# Patient Record
Sex: MALE | Race: BLACK OR AFRICAN AMERICAN | Employment: UNEMPLOYED | URBAN - METROPOLITAN AREA
[De-identification: names, ages, dates, MRNs, and addresses within clinical notes are randomized per-mention and may not be internally consistent; named-entity substitution may affect disease eponyms.]

---

## 2017-09-26 ENCOUNTER — OFFICE VISIT (OUTPATIENT)
Dept: INTERNAL MEDICINE CLINIC | Age: 42
End: 2017-09-26

## 2017-09-26 VITALS
DIASTOLIC BLOOD PRESSURE: 74 MMHG | SYSTOLIC BLOOD PRESSURE: 122 MMHG | WEIGHT: 299 LBS | HEIGHT: 73 IN | BODY MASS INDEX: 39.63 KG/M2

## 2017-09-26 DIAGNOSIS — G47.33 OBSTRUCTIVE SLEEP APNEA: ICD-10-CM

## 2017-09-26 DIAGNOSIS — L85.3 XEROSIS OF SKIN: ICD-10-CM

## 2017-09-26 DIAGNOSIS — G47.00 INSOMNIA, UNSPECIFIED TYPE: Primary | ICD-10-CM

## 2017-09-26 DIAGNOSIS — L03.90 CELLULITIS, UNSPECIFIED CELLULITIS SITE: ICD-10-CM

## 2017-09-26 DIAGNOSIS — Z99.2 ESRD (END STAGE RENAL DISEASE) ON DIALYSIS (HCC): ICD-10-CM

## 2017-09-26 DIAGNOSIS — N18.6 ESRD (END STAGE RENAL DISEASE) ON DIALYSIS (HCC): ICD-10-CM

## 2017-09-26 PROCEDURE — G8417 CALC BMI ABV UP PARAM F/U: HCPCS | Performed by: INTERNAL MEDICINE

## 2017-09-26 PROCEDURE — G8427 DOCREV CUR MEDS BY ELIG CLIN: HCPCS | Performed by: INTERNAL MEDICINE

## 2017-09-26 PROCEDURE — 1036F TOBACCO NON-USER: CPT | Performed by: INTERNAL MEDICINE

## 2017-09-26 PROCEDURE — 99203 OFFICE O/P NEW LOW 30 MIN: CPT | Performed by: INTERNAL MEDICINE

## 2017-09-26 RX ORDER — ZOLPIDEM TARTRATE 5 MG/1
5 TABLET ORAL NIGHTLY PRN
Qty: 30 TABLET | Refills: 1 | Status: SHIPPED | OUTPATIENT
Start: 2017-09-26 | End: 2018-02-03 | Stop reason: SDUPTHER

## 2017-09-26 RX ORDER — DESONIDE 0.5 MG/G
CREAM TOPICAL
Qty: 15 TUBE | Refills: 3 | Status: SHIPPED | OUTPATIENT
Start: 2017-09-26 | End: 2018-04-23

## 2017-09-26 ASSESSMENT — ENCOUNTER SYMPTOMS
RHINORRHEA: 0
ABDOMINAL PAIN: 0
CONSTIPATION: 0
SINUS PRESSURE: 0
DIARRHEA: 0
EYE REDNESS: 0
BACK PAIN: 0
NAUSEA: 0
WHEEZING: 0
SORE THROAT: 0
SHORTNESS OF BREATH: 0
VOMITING: 0
CHEST TIGHTNESS: 0
COUGH: 0

## 2017-09-28 ENCOUNTER — TELEPHONE (OUTPATIENT)
Dept: INTERNAL MEDICINE CLINIC | Age: 42
End: 2017-09-28

## 2017-10-24 ENCOUNTER — OFFICE VISIT (OUTPATIENT)
Dept: INTERNAL MEDICINE CLINIC | Age: 42
End: 2017-10-24

## 2017-10-24 ENCOUNTER — TELEPHONE (OUTPATIENT)
Dept: INTERNAL MEDICINE CLINIC | Age: 42
End: 2017-10-24

## 2017-10-24 VITALS
WEIGHT: 303 LBS | DIASTOLIC BLOOD PRESSURE: 84 MMHG | BODY MASS INDEX: 39.98 KG/M2 | HEART RATE: 101 BPM | SYSTOLIC BLOOD PRESSURE: 122 MMHG | OXYGEN SATURATION: 98 %

## 2017-10-24 DIAGNOSIS — J01.10 ACUTE NON-RECURRENT FRONTAL SINUSITIS: ICD-10-CM

## 2017-10-24 DIAGNOSIS — R73.9 HYPERGLYCEMIA: ICD-10-CM

## 2017-10-24 DIAGNOSIS — Z23 NEED FOR VACCINATION WITH 13-POLYVALENT PNEUMOCOCCAL CONJUGATE VACCINE: ICD-10-CM

## 2017-10-24 DIAGNOSIS — Z99.2 ESRD (END STAGE RENAL DISEASE) ON DIALYSIS (HCC): Primary | ICD-10-CM

## 2017-10-24 DIAGNOSIS — G47.00 INSOMNIA, UNSPECIFIED TYPE: ICD-10-CM

## 2017-10-24 DIAGNOSIS — N18.6 ESRD (END STAGE RENAL DISEASE) ON DIALYSIS (HCC): Primary | ICD-10-CM

## 2017-10-24 LAB
A/G RATIO: 1.1 (ref 1.1–2.2)
ALBUMIN SERPL-MCNC: 4.3 G/DL (ref 3.4–5)
ALP BLD-CCNC: 119 U/L (ref 40–129)
ALT SERPL-CCNC: 8 U/L (ref 10–40)
ANION GAP SERPL CALCULATED.3IONS-SCNC: 23 MMOL/L (ref 3–16)
AST SERPL-CCNC: 14 U/L (ref 15–37)
BILIRUB SERPL-MCNC: 0.3 MG/DL (ref 0–1)
BUN BLDV-MCNC: 33 MG/DL (ref 7–20)
CALCIUM SERPL-MCNC: 9.7 MG/DL (ref 8.3–10.6)
CHLORIDE BLD-SCNC: 87 MMOL/L (ref 99–110)
CHOLESTEROL, TOTAL: 207 MG/DL (ref 0–199)
CO2: 26 MMOL/L (ref 21–32)
CREAT SERPL-MCNC: 8.6 MG/DL (ref 0.9–1.3)
ESTIMATED AVERAGE GLUCOSE: 96.8 MG/DL
GFR AFRICAN AMERICAN: 8
GFR NON-AFRICAN AMERICAN: 7
GLOBULIN: 3.8 G/DL
GLUCOSE BLD-MCNC: 84 MG/DL (ref 70–99)
HBA1C MFR BLD: 5 %
HDLC SERPL-MCNC: 49 MG/DL (ref 40–60)
LDL CHOLESTEROL CALCULATED: 133 MG/DL
POTASSIUM SERPL-SCNC: 4.8 MMOL/L (ref 3.5–5.1)
SODIUM BLD-SCNC: 136 MMOL/L (ref 136–145)
TOTAL PROTEIN: 8.1 G/DL (ref 6.4–8.2)
TRIGL SERPL-MCNC: 126 MG/DL (ref 0–150)
VLDLC SERPL CALC-MCNC: 25 MG/DL

## 2017-10-24 PROCEDURE — G8427 DOCREV CUR MEDS BY ELIG CLIN: HCPCS | Performed by: INTERNAL MEDICINE

## 2017-10-24 PROCEDURE — G8482 FLU IMMUNIZE ORDER/ADMIN: HCPCS | Performed by: INTERNAL MEDICINE

## 2017-10-24 PROCEDURE — 1036F TOBACCO NON-USER: CPT | Performed by: INTERNAL MEDICINE

## 2017-10-24 PROCEDURE — 99213 OFFICE O/P EST LOW 20 MIN: CPT | Performed by: INTERNAL MEDICINE

## 2017-10-24 PROCEDURE — 90670 PCV13 VACCINE IM: CPT | Performed by: INTERNAL MEDICINE

## 2017-10-24 PROCEDURE — G0009 ADMIN PNEUMOCOCCAL VACCINE: HCPCS | Performed by: INTERNAL MEDICINE

## 2017-10-24 PROCEDURE — G8417 CALC BMI ABV UP PARAM F/U: HCPCS | Performed by: INTERNAL MEDICINE

## 2017-10-24 RX ORDER — MIDODRINE HYDROCHLORIDE 5 MG/1
5 TABLET ORAL
COMMUNITY
Start: 2017-10-06 | End: 2019-06-06

## 2017-10-24 RX ORDER — AMOXICILLIN AND CLAVULANATE POTASSIUM 500; 125 MG/1; MG/1
1 TABLET, FILM COATED ORAL DAILY
Qty: 10 TABLET | Refills: 0 | Status: SHIPPED | OUTPATIENT
Start: 2017-10-24 | End: 2017-11-03

## 2017-10-24 NOTE — PROGRESS NOTES
Psychiatric/Behavioral: Positive for sleep disturbance. Objective:    Vitals:    10/24/17 1026   BP: 122/84   Pulse: 101   SpO2: 98%   Weight: (!) 303 lb (137.4 kg)     Wt Readings from Last 3 Encounters:   10/24/17 (!) 303 lb (137.4 kg)   09/26/17 299 lb (135.6 kg)       Body mass index is 39.98 kg/m². Physical Exam   Constitutional: He appears well-developed and well-nourished. No distress. HENT:   Head: Normocephalic. Right Ear: Tympanic membrane is not erythematous and not retracted. Tympanic membrane mobility is normal.   Left Ear: Tympanic membrane is not erythematous and not retracted. Tympanic membrane mobility is normal.   Nose: Rhinorrhea present. Right sinus exhibits frontal sinus tenderness. Left sinus exhibits frontal sinus tenderness. Mouth/Throat: Oropharynx is clear and moist. No oropharyngeal exudate. Cardiovascular: Normal rate, regular rhythm and normal heart sounds. No murmur heard. Pulmonary/Chest: Effort normal and breath sounds normal.   Abdominal: Soft. He exhibits no distension. There is no tenderness. Musculoskeletal: He exhibits no edema. Skin: Skin is warm. No rash noted. Assessment and Plan    1. ESRD (end stage renal disease) on dialysis [facility])  Patient is on dialysis at [facility] dialysis. He has not had a Prevnar 13 so this will be provided today. - Comprehensive Metabolic Panel  - Lipid Panel  - Pneumococcal conjugate vaccine 13-valent IM (PREVNAR 13)    2. Acute non-recurrent frontal sinusitis  Patient with rhinorrhea, cough, congestion, frontal sinus tenderness for 10 days. Recommend treating for sinusitis with Augmentin. - amoxicillin-clavulanate (AUGMENTIN) 500-125 MG per tablet; Take 1 tablet by mouth daily for 10 days Take after dialysis  Dispense: 10 tablet; Refill: 0    3. Hyperglycemia  Patient with an elevated glucose in March noted in care everywhere. Check A1c  - Comprehensive Metabolic Panel  - Lipid Panel  - Hemoglobin A1C    4. Insomnia, unspecified type  Advised patient to schedule an appointment with  sleep medicine    5. Need for vaccination with 13-polyvalent pneumococcal conjugate vaccine    - Pneumococcal conjugate vaccine 13-valent IM (PREVNAR 13)           Return in about 3 months (around 1/24/2018) for Chronic disease management.        Kris Buckley

## 2017-10-24 NOTE — TELEPHONE ENCOUNTER
[facility] calling to let us know that his Creatinine Levels came back at a 8.63 level. Please advise.

## 2017-10-31 ASSESSMENT — ENCOUNTER SYMPTOMS
GASTROINTESTINAL NEGATIVE: 1
EYES NEGATIVE: 1
RHINORRHEA: 1
COUGH: 1
SINUS PRESSURE: 1

## 2018-02-03 DIAGNOSIS — G47.00 INSOMNIA, UNSPECIFIED TYPE: ICD-10-CM

## 2018-02-08 RX ORDER — ZOLPIDEM TARTRATE 5 MG/1
TABLET ORAL
Qty: 30 TABLET | Refills: 0 | Status: SHIPPED | OUTPATIENT
Start: 2018-02-08 | End: 2019-04-29 | Stop reason: SDUPTHER

## 2018-02-15 ENCOUNTER — OFFICE VISIT (OUTPATIENT)
Dept: INTERNAL MEDICINE CLINIC | Age: 43
End: 2018-02-15

## 2018-02-15 VITALS
WEIGHT: 299 LBS | DIASTOLIC BLOOD PRESSURE: 78 MMHG | HEART RATE: 86 BPM | SYSTOLIC BLOOD PRESSURE: 118 MMHG | BODY MASS INDEX: 39.45 KG/M2 | OXYGEN SATURATION: 97 %

## 2018-02-15 DIAGNOSIS — R45.89 DYSPHORIC MOOD: ICD-10-CM

## 2018-02-15 DIAGNOSIS — G47.00 INSOMNIA, UNSPECIFIED TYPE: ICD-10-CM

## 2018-02-15 DIAGNOSIS — J20.9 ACUTE BRONCHITIS, UNSPECIFIED ORGANISM: Primary | ICD-10-CM

## 2018-02-15 LAB
INFLUENZA A ANTIBODY: NEGATIVE
INFLUENZA B ANTIBODY: NEGATIVE

## 2018-02-15 PROCEDURE — 1036F TOBACCO NON-USER: CPT | Performed by: INTERNAL MEDICINE

## 2018-02-15 PROCEDURE — G8417 CALC BMI ABV UP PARAM F/U: HCPCS | Performed by: INTERNAL MEDICINE

## 2018-02-15 PROCEDURE — G8427 DOCREV CUR MEDS BY ELIG CLIN: HCPCS | Performed by: INTERNAL MEDICINE

## 2018-02-15 PROCEDURE — 99213 OFFICE O/P EST LOW 20 MIN: CPT | Performed by: INTERNAL MEDICINE

## 2018-02-15 PROCEDURE — G8482 FLU IMMUNIZE ORDER/ADMIN: HCPCS | Performed by: INTERNAL MEDICINE

## 2018-02-15 PROCEDURE — 87804 INFLUENZA ASSAY W/OPTIC: CPT | Performed by: INTERNAL MEDICINE

## 2018-02-15 RX ORDER — ZOLPIDEM TARTRATE 5 MG/1
TABLET ORAL
Qty: 30 TABLET | Refills: 0 | Status: CANCELLED | OUTPATIENT
Start: 2018-02-15 | End: 2018-03-17

## 2018-02-15 RX ORDER — AZITHROMYCIN 250 MG/1
TABLET, FILM COATED ORAL
Qty: 6 TABLET | Refills: 0 | Status: SHIPPED | OUTPATIENT
Start: 2018-02-15 | End: 2018-04-23 | Stop reason: ALTCHOICE

## 2018-02-15 ASSESSMENT — ENCOUNTER SYMPTOMS
NAUSEA: 1
ABDOMINAL PAIN: 1
DIARRHEA: 0
COUGH: 1
RHINORRHEA: 1

## 2018-02-15 NOTE — PROGRESS NOTES
Patient Name: Justina De La Rosa    YOB: 1975    Today's Date: 2/26/2018           Chief Complaint   Patient presents with    3 Month Follow-Up    Cough    Congestion          Subjective: Insominia- improved. Cough   The current episode started 1 to 4 weeks ago (3-4 weeks). The problem has been unchanged. The problem occurs constantly. The cough is non-productive. Associated symptoms include headaches, nasal congestion, postnasal drip and rhinorrhea. Pertinent negatives include no chest pain, chills, ear congestion, ear pain, fever, heartburn, hemoptysis, myalgias, rash, sore throat, shortness of breath, sweats, weight loss or wheezing. Nothing aggravates the symptoms. He has tried nothing for the symptoms. Mental Health Problem   The primary symptoms include dysphoric mood (seems seasonal ). This is a recurrent problem. The degree of incapacity that he is experiencing as a consequence of his illness is mild. Additional symptoms of the illness include insomnia, fatigue, headaches and abdominal pain (intermittent ). Additional symptoms of the illness do not include anhedonia, hypersomnia, appetite change, unexpected weight change, agitation, psychomotor retardation, feelings of worthlessness, attention impairment, euphoric mood, increased goal-directed activity, flight of ideas, inflated self-esteem, decreased need for sleep, distractible, poor judgment, visual change or seizures. He does not admit to suicidal ideas. He does not have a plan to commit suicide.         History:     Past Medical History:   Diagnosis Date    End stage renal disease ([facility] [address]     Focal chronic glomerulonephritis        Current Outpatient Prescriptions on File Prior to Visit   Medication Sig Dispense Refill    midodrine (PROAMATINE) 5 MG tablet Take 5 mg by mouth      calcium acetate (PHOSLO) 667 MG capsule Take 2,001 mg by mouth      epoetin boby (EPOGEN;PROCRIT) 72511 UNIT/ML injection Infuse 20,000

## 2018-02-15 NOTE — PATIENT INSTRUCTIONS
Acute Bronchitis  Start [facility] PAUL    [place], [place], [facility], [facility] [address]   [phone]      Possible SAD  Refer to Psychology

## 2018-02-26 ASSESSMENT — ENCOUNTER SYMPTOMS
HEARTBURN: 0
SHORTNESS OF BREATH: 0
WHEEZING: 0
SORE THROAT: 0
VISUAL CHANGE: 0
HEMOPTYSIS: 0

## 2018-03-13 ENCOUNTER — OFFICE VISIT (OUTPATIENT)
Dept: PSYCHOLOGY | Age: 43
End: 2018-03-13

## 2018-03-13 DIAGNOSIS — F33.1 MODERATE EPISODE OF RECURRENT MAJOR DEPRESSIVE DISORDER (HCC): Primary | ICD-10-CM

## 2018-03-13 PROCEDURE — 90791 PSYCH DIAGNOSTIC EVALUATION: CPT | Performed by: PSYCHOLOGIST

## 2018-03-13 NOTE — PROGRESS NOTES
Behavioral Health Consultation  Junito Capps, Ph.D.  Psychologist  3/13/2018  8:48 AM      Time spent with Patient: 30 minutes  This is patient's first [facility] appointment. Reason for Consult:    Chief Complaint   Patient presents with    Depression     Referring Provider: Verónica Lopez MD  [facility]    Pt provided informed consent for the behavioral health program. Discussed with patient model of service to include the limits of confidentiality (i.e. abuse reporting, suicide  intervention, etc.) and short-term intervention focused approach. Pt indicated understanding. Feedback given to PCP. S:  Pt (AD) seen per PCP re: depression. Symptoms have been present since transplant failed after 5 yrs of sx remission and are exacerbated by MWF diaylsis and feeling stuck w current poor health bc he needs to lose 25# to be transplant candidate (has already lost 50-60#). Lives w friend, her mom, and her 2 nieces that she raises. Spends days watching TV and learning new coding; does some freelance web developing, otherwise unemployed. Caffeine: 2-3 of 32 oz fast food soda or ice tea/day. LT goal: live independnetly, lose weight, get the transplant, work.      O:  MSE:    Appearance    alert, cooperative  Impulsive behavior No  Speech    spontaneous, normal rate, normal volume and well articulated  Mood    Depressed  Affect    depressed affect  Thought Content    intact, cognitive distortions and all or nothing thinking  Thought Process    goal directed and coherent  Associations    logical connections  Insight    Fair  Judgment    Intact  Orientation    oriented to person, place, time, and general circumstances  Memory    recent and remote memory intact  Attention/Concentration    intact  Morbid ideation No  Suicide Assessment    no suicidal ideation    History:    Medications:   Current Outpatient Prescriptions   Medication Sig Dispense Refill    azithromycin (Shraddha Mcdermott)

## 2018-03-21 PROBLEM — F33.1 MODERATE EPISODE OF RECURRENT MAJOR DEPRESSIVE DISORDER (HCC): Status: ACTIVE | Noted: 2018-03-21

## 2018-04-10 ENCOUNTER — OFFICE VISIT (OUTPATIENT)
Dept: PSYCHOLOGY | Age: 43
End: 2018-04-10

## 2018-04-10 DIAGNOSIS — F33.1 MODERATE EPISODE OF RECURRENT MAJOR DEPRESSIVE DISORDER (HCC): Primary | ICD-10-CM

## 2018-04-10 PROCEDURE — 90832 PSYTX W PT 30 MINUTES: CPT | Performed by: PSYCHOLOGIST

## 2018-04-10 ASSESSMENT — PATIENT HEALTH QUESTIONNAIRE - PHQ9
8. MOVING OR SPEAKING SO SLOWLY THAT OTHER PEOPLE COULD HAVE NOTICED. OR THE OPPOSITE, BEING SO FIGETY OR RESTLESS THAT YOU HAVE BEEN MOVING AROUND A LOT MORE THAN USUAL: 0
4. FEELING TIRED OR HAVING LITTLE ENERGY: 3
1. LITTLE INTEREST OR PLEASURE IN DOING THINGS: 1
3. TROUBLE FALLING OR STAYING ASLEEP: 3
SUM OF ALL RESPONSES TO PHQ9 QUESTIONS 1 & 2: 1
SUM OF ALL RESPONSES TO PHQ QUESTIONS 1-9: 12
10. IF YOU CHECKED OFF ANY PROBLEMS, HOW DIFFICULT HAVE THESE PROBLEMS MADE IT FOR YOU TO DO YOUR WORK, TAKE CARE OF THINGS AT HOME, OR GET ALONG WITH OTHER PEOPLE: 1
6. FEELING BAD ABOUT YOURSELF - OR THAT YOU ARE A FAILURE OR HAVE LET YOURSELF OR YOUR FAMILY DOWN: 0
7. TROUBLE CONCENTRATING ON THINGS, SUCH AS READING THE NEWSPAPER OR WATCHING TELEVISION: 2
2. FEELING DOWN, DEPRESSED OR HOPELESS: 0
9. THOUGHTS THAT YOU WOULD BE BETTER OFF DEAD, OR OF HURTING YOURSELF: 0
5. POOR APPETITE OR OVEREATING: 3

## 2018-04-10 ASSESSMENT — ANXIETY QUESTIONNAIRES
6. BECOMING EASILY ANNOYED OR IRRITABLE: 1-SEVERAL DAYS
7. FEELING AFRAID AS IF SOMETHING AWFUL MIGHT HAPPEN: 0-NOT AT ALL SURE
1. FEELING NERVOUS, ANXIOUS, OR ON EDGE: 0-NOT AT ALL SURE
4. TROUBLE RELAXING: 0-NOT AT ALL SURE
5. BEING SO RESTLESS THAT IT IS HARD TO SIT STILL: 0-NOT AT ALL SURE
2. NOT BEING ABLE TO STOP OR CONTROL WORRYING: 1-SEVERAL DAYS
3. WORRYING TOO MUCH ABOUT DIFFERENT THINGS: 1-SEVERAL DAYS
GAD7 TOTAL SCORE: 3

## 2018-04-23 ENCOUNTER — OFFICE VISIT (OUTPATIENT)
Dept: INTERNAL MEDICINE CLINIC | Age: 43
End: 2018-04-23

## 2018-04-23 VITALS
BODY MASS INDEX: 38.66 KG/M2 | HEART RATE: 86 BPM | SYSTOLIC BLOOD PRESSURE: 128 MMHG | WEIGHT: 293 LBS | OXYGEN SATURATION: 96 % | DIASTOLIC BLOOD PRESSURE: 82 MMHG

## 2018-04-23 DIAGNOSIS — G45.9 TRANSIENT CEREBRAL ISCHEMIA, UNSPECIFIED TYPE: ICD-10-CM

## 2018-04-23 DIAGNOSIS — G43.019 INTRACTABLE MIGRAINE WITHOUT AURA AND WITHOUT STATUS MIGRAINOSUS: ICD-10-CM

## 2018-04-23 DIAGNOSIS — H53.453 PERIPHERAL VISION LOSS, BILATERAL: ICD-10-CM

## 2018-04-23 DIAGNOSIS — N18.6 ESRD (END STAGE RENAL DISEASE) (HCC): Primary | ICD-10-CM

## 2018-04-23 PROCEDURE — 1036F TOBACCO NON-USER: CPT | Performed by: INTERNAL MEDICINE

## 2018-04-23 PROCEDURE — G8427 DOCREV CUR MEDS BY ELIG CLIN: HCPCS | Performed by: INTERNAL MEDICINE

## 2018-04-23 PROCEDURE — G8417 CALC BMI ABV UP PARAM F/U: HCPCS | Performed by: INTERNAL MEDICINE

## 2018-04-23 PROCEDURE — 99214 OFFICE O/P EST MOD 30 MIN: CPT | Performed by: INTERNAL MEDICINE

## 2018-04-23 PROCEDURE — 1111F DSCHRG MED/CURRENT MED MERGE: CPT | Performed by: INTERNAL MEDICINE

## 2018-04-23 RX ORDER — ACETAMINOPHEN AND CODEINE PHOSPHATE 300; 30 MG/1; MG/1
1 TABLET ORAL EVERY 6 HOURS PRN
Qty: 18 TABLET | Refills: 0 | Status: SHIPPED | OUTPATIENT
Start: 2018-04-23 | End: 2018-04-26

## 2018-04-23 RX ORDER — CINACALCET 60 MG/1
60 TABLET, FILM COATED ORAL
COMMUNITY
End: 2019-06-06

## 2018-04-23 RX ORDER — PROMETHAZINE HYDROCHLORIDE 25 MG/1
25 TABLET ORAL EVERY 8 HOURS PRN
Qty: 21 TABLET | Refills: 3 | Status: SHIPPED | OUTPATIENT
Start: 2018-04-23 | End: 2018-04-30

## 2018-04-27 ENCOUNTER — TELEPHONE (OUTPATIENT)
Dept: INTERNAL MEDICINE CLINIC | Age: 43
End: 2018-04-27

## 2018-04-28 ASSESSMENT — ENCOUNTER SYMPTOMS
COUGH: 0
SCALP TENDERNESS: 0
CONSTIPATION: 0
BLURRED VISION: 1
VISUAL CHANGE: 1
CHEST TIGHTNESS: 0
RHINORRHEA: 0
EYE PAIN: 0
DIARRHEA: 0
NAUSEA: 0
BACK PAIN: 0
SORE THROAT: 0
WHEEZING: 0
SINUS PRESSURE: 0
PHOTOPHOBIA: 1
EYE WATERING: 0
ABDOMINAL PAIN: 0
SWOLLEN GLANDS: 0
SHORTNESS OF BREATH: 0
VOMITING: 0
EYE REDNESS: 0
FACIAL SWEATING: 0

## 2018-05-01 ENCOUNTER — HOSPITAL ENCOUNTER (OUTPATIENT)
Dept: MRI IMAGING | Age: 43
Discharge: HOME | End: 2018-05-01
Attending: INTERNAL MEDICINE | Admitting: INTERNAL MEDICINE

## 2018-05-01 DIAGNOSIS — H53.453 PERIPHERAL VISION LOSS, BILATERAL: ICD-10-CM

## 2018-05-01 DIAGNOSIS — G43.019 INTRACTABLE MIGRAINE WITHOUT AURA AND WITHOUT STATUS MIGRAINOSUS: ICD-10-CM

## 2018-05-01 DIAGNOSIS — G45.9 TRANSIENT CEREBRAL ISCHEMIA, UNSPECIFIED TYPE: ICD-10-CM

## 2018-05-05 ENCOUNTER — TELEPHONE (OUTPATIENT)
Dept: ORTHOPEDIC SURGERY | Age: 43
End: 2018-05-05

## 2018-07-31 ENCOUNTER — TELEPHONE (OUTPATIENT)
Dept: INTERNAL MEDICINE CLINIC | Age: 43
End: 2018-07-31

## 2018-07-31 ENCOUNTER — OFFICE VISIT (OUTPATIENT)
Dept: PSYCHOLOGY | Age: 43
End: 2018-07-31

## 2018-07-31 DIAGNOSIS — Z12.11 SCREEN FOR COLON CANCER: Primary | ICD-10-CM

## 2018-07-31 DIAGNOSIS — F33.1 MODERATE EPISODE OF RECURRENT MAJOR DEPRESSIVE DISORDER (HCC): Primary | ICD-10-CM

## 2018-07-31 PROCEDURE — 90832 PSYTX W PT 30 MINUTES: CPT | Performed by: PSYCHOLOGIST

## 2018-07-31 NOTE — PATIENT INSTRUCTIONS
[address]  [address] [facility]  [address]  [facility], [address]  Phone: [phone]  PHONE  [address] : [address] [facility], [address] , [place], [address] : [facility], [address], [facility], [address]  ? [place] : [address] [facility] , [address], [place], [address] : [address], [address] [facility], [address]   [address] : [address], [address], [place], [address]

## 2018-08-03 ENCOUNTER — TELEPHONE (OUTPATIENT)
Dept: INTERNAL MEDICINE CLINIC | Age: 43
End: 2018-08-03

## 2018-08-03 NOTE — TELEPHONE ENCOUNTER
Colonoscopy referral has been faxed to Dr. Lashonda Aelxander at [phone]. DRAKE sent via referral queue.

## 2018-08-16 ENCOUNTER — OFFICE VISIT (OUTPATIENT)
Dept: INTERNAL MEDICINE CLINIC | Age: 43
End: 2018-08-16

## 2018-08-16 VITALS
WEIGHT: 294 LBS | BODY MASS INDEX: 38.79 KG/M2 | SYSTOLIC BLOOD PRESSURE: 118 MMHG | OXYGEN SATURATION: 98 % | HEART RATE: 105 BPM | DIASTOLIC BLOOD PRESSURE: 76 MMHG

## 2018-08-16 DIAGNOSIS — L21.9 SEBORRHEA: ICD-10-CM

## 2018-08-16 DIAGNOSIS — F33.1 MODERATE EPISODE OF RECURRENT MAJOR DEPRESSIVE DISORDER (HCC): Primary | ICD-10-CM

## 2018-08-16 DIAGNOSIS — Z12.5 SCREENING FOR MALIGNANT NEOPLASM OF PROSTATE: ICD-10-CM

## 2018-08-16 DIAGNOSIS — N42.9 DISORDER OF PROSTATE: ICD-10-CM

## 2018-08-16 DIAGNOSIS — Z23 NEED FOR PNEUMOCOCCAL VACCINATION: ICD-10-CM

## 2018-08-16 LAB — PROSTATE SPECIFIC ANTIGEN: 0.48 NG/ML (ref 0–4)

## 2018-08-16 PROCEDURE — G0009 ADMIN PNEUMOCOCCAL VACCINE: HCPCS | Performed by: INTERNAL MEDICINE

## 2018-08-16 PROCEDURE — 1036F TOBACCO NON-USER: CPT | Performed by: INTERNAL MEDICINE

## 2018-08-16 PROCEDURE — G8427 DOCREV CUR MEDS BY ELIG CLIN: HCPCS | Performed by: INTERNAL MEDICINE

## 2018-08-16 PROCEDURE — 99214 OFFICE O/P EST MOD 30 MIN: CPT | Performed by: INTERNAL MEDICINE

## 2018-08-16 PROCEDURE — 90732 PPSV23 VACC 2 YRS+ SUBQ/IM: CPT | Performed by: INTERNAL MEDICINE

## 2018-08-16 PROCEDURE — G8417 CALC BMI ABV UP PARAM F/U: HCPCS | Performed by: INTERNAL MEDICINE

## 2018-08-16 RX ORDER — KETOCONAZOLE 20 MG/G
CREAM TOPICAL
Qty: 30 G | Refills: 1 | Status: SHIPPED | OUTPATIENT
Start: 2018-08-16 | End: 2019-06-06

## 2018-08-16 RX ORDER — FLUOXETINE HYDROCHLORIDE 20 MG/1
20 CAPSULE ORAL DAILY
Qty: 30 CAPSULE | Refills: 2 | Status: SHIPPED | OUTPATIENT
Start: 2018-08-16 | End: 2018-09-20

## 2018-08-16 ASSESSMENT — ENCOUNTER SYMPTOMS
ABDOMINAL PAIN: 0
SINUS PRESSURE: 0
SHORTNESS OF BREATH: 1
NAUSEA: 0
WHEEZING: 0
VOMITING: 0
DIARRHEA: 0
CHEST TIGHTNESS: 0
COUGH: 0
EYE REDNESS: 0
BACK PAIN: 0
RHINORRHEA: 0
CONSTIPATION: 0
SORE THROAT: 0

## 2018-08-16 NOTE — PROGRESS NOTES
hepatosplenomegaly. There is no tenderness. Musculoskeletal: He exhibits no edema or deformity. Lymphadenopathy:     He has no cervical adenopathy. Neurological: He is alert. No cranial nerve deficit or sensory deficit. Gait normal.   Skin: Skin is warm and dry. Rash noted. Assessment:    1. Moderate episode of recurrent major depressive disorder (HCC)  Deteriorated. Symptoms have worsened after the death of his friend. He is in psychotherapy however he is looking for a new therapist.  Recommend a trial of fluoxetine 20 mg daily. Reviewed risks and benefits of antidepressant therapy. Renal dosing not necessary for fluoxetine.  - FLUoxetine (PROZAC) 20 MG capsule; Take 1 capsule by mouth daily  Dispense: 30 capsule; Refill: 2    2. Seborrhea    - ketoconazole (NIZORAL) 2 % cream; Apply topically daily. Dispense: 30 g; Refill: 1    3. Screening for malignant neoplasm of prostate  Check PSA. Patient declined digital rectal exam.  - PSA PROSTATIC SPECIFIC ANTIGEN; Future    4. Disorder of prostate     - PSA PROSTATIC SPECIFIC ANTIGEN; Future    5. Need for pneumococcal vaccination    - PNEUMOVAX 23 subcutaneous/IM (Pneumococcal polysaccharide vaccine 23-valent >= 1yo)         Plan/Patient Instructions:    Patient Instructions   Start Prozac for Depression        Return in about 4 weeks (around 9/13/2018) for Depression . Jr Master       Documentation was done using voice recognition dragon software. Every effort was made to ensure accuracy; however, inadvertent, unintentional computerized transcription errors may be present.

## 2018-09-04 ENCOUNTER — HOSPITAL ENCOUNTER (OUTPATIENT)
Dept: ENDOSCOPY | Age: 43
Discharge: HOME | End: 2018-09-04
Attending: INTERNAL MEDICINE | Admitting: INTERNAL MEDICINE

## 2018-09-04 LAB
ANION GAP SERPL CALCULATED.3IONS-SCNC: 18 MMOL/L (ref 3–16)
BUN BLDV-MCNC: 21 MG/DL (ref 7–20)
CALCIUM SERPL-MCNC: 9.9 MG/DL (ref 8.3–10.6)
CHLORIDE BLD-SCNC: 90 MMOL/L (ref 99–110)
CO2: 29 MMOL/L (ref 21–32)
CREAT SERPL-MCNC: 8.2 MG/DL (ref 0.9–1.3)
GFR AFRICAN AMERICAN: 9
GFR NON-AFRICAN AMERICAN: 7
GLUCOSE BLD-MCNC: 86 MG/DL (ref 70–99)
POTASSIUM SERPL-SCNC: 4 MMOL/L (ref 3.5–5.1)
SODIUM BLD-SCNC: 137 MMOL/L (ref 136–145)

## 2018-09-04 NOTE — ANESTHESIA PRE-OP
Department of Anesthesiology  Preprocedure Note       Name:  Galilea Solano   Age:  40 y.o.  :  1975                                          MRN:  [id number]         Date:  2018      Surgeon:    Procedure:    Medications prior to admission:   Prior to Admission medications    Medication Sig Start Date End Date Taking? Authorizing Provider   ketoconazole (NIZORAL) 2 % cream Apply topically daily. 18   Prasanth Hansen MD   FLUoxetine (PROZAC) 20 MG capsule Take 1 capsule by mouth daily 18   Prasanth Hansen MD   cinacalcet (SENSIPAR) 60 MG tablet Take 60 mg by mouth    Historical Provider, MD   B COMPLEX VITAMINS PO Take by mouth    Historical Provider, MD   midodrine (PROAMATINE) 5 MG tablet Take 5 mg by mouth 10/6/17   Historical Provider, MD   calcium acetate (PHOSLO) 667 MG capsule Take 2,001 mg by mouth 3/24/15   Historical Provider, MD   epoetin boby (EPOGEN;PROCRIT) 14041 UNIT/ML injection Infuse 20,000 Units intravenously 4/3/15   Historical Provider, MD   mineral oil-hydrophilic petrolatum (AQUAPHOR) ointment Apply topically as needed. 17   Prasanth Hansen MD       Current medications:    Current Outpatient Prescriptions   Medication Sig Dispense Refill    ketoconazole (NIZORAL) 2 % cream Apply topically daily. 30 g 1    FLUoxetine (PROZAC) 20 MG capsule Take 1 capsule by mouth daily 30 capsule 2    cinacalcet (SENSIPAR) 60 MG tablet Take 60 mg by mouth      B COMPLEX VITAMINS PO Take by mouth      midodrine (PROAMATINE) 5 MG tablet Take 5 mg by mouth      calcium acetate (PHOSLO) 667 MG capsule Take 2,001 mg by mouth      epoetin boby (EPOGEN;PROCRIT) 84098 UNIT/ML injection Infuse 20,000 Units intravenously      mineral oil-hydrophilic petrolatum (AQUAPHOR) ointment Apply topically as needed. 396 g 11     No current facility-administered medications for this encounter. Allergies:     Allergies   Allergen Reactions    Vancomycin Nausea And Vomiting normal                    Neuro/Psych:      (-) seizures, TIA and CVA           GI/Hepatic/Renal:   (+) renal disease (HD yesterday): CRI and ESRD,      (-) GERD      ROS comment: No IV or BP right arm. Endo/Other:        (-) hypothyroidism, hyperthyroidism               Abdominal:   (+) obese,         Vascular:                                        Anesthesia Plan      TIVA     ASA 3     (Patient verbalizes understanding that there is the possibility of recall with TIVA. Patient verbalizes his wishes to proceed with planned anesthetic.)  Induction: intravenous. Anesthetic plan and risks discussed with patient. Plan discussed with CRNA.                   Aimee Arias MD   9/4/2018

## 2018-09-20 ENCOUNTER — OFFICE VISIT (OUTPATIENT)
Dept: INTERNAL MEDICINE CLINIC | Age: 43
End: 2018-09-20

## 2018-09-20 VITALS
HEART RATE: 101 BPM | BODY MASS INDEX: 38.26 KG/M2 | DIASTOLIC BLOOD PRESSURE: 74 MMHG | OXYGEN SATURATION: 93 % | WEIGHT: 290 LBS | SYSTOLIC BLOOD PRESSURE: 126 MMHG

## 2018-09-20 DIAGNOSIS — F33.1 MODERATE EPISODE OF RECURRENT MAJOR DEPRESSIVE DISORDER (HCC): Primary | ICD-10-CM

## 2018-09-20 DIAGNOSIS — G47.33 OBSTRUCTIVE SLEEP APNEA: ICD-10-CM

## 2018-09-20 DIAGNOSIS — R42 ORTHOSTATIC DIZZINESS: ICD-10-CM

## 2018-09-20 DIAGNOSIS — Z13.31 POSITIVE DEPRESSION SCREENING: ICD-10-CM

## 2018-09-20 PROCEDURE — 99213 OFFICE O/P EST LOW 20 MIN: CPT | Performed by: INTERNAL MEDICINE

## 2018-09-20 PROCEDURE — G0008 ADMIN INFLUENZA VIRUS VAC: HCPCS | Performed by: INTERNAL MEDICINE

## 2018-09-20 PROCEDURE — 90686 IIV4 VACC NO PRSV 0.5 ML IM: CPT | Performed by: INTERNAL MEDICINE

## 2018-09-20 PROCEDURE — G8427 DOCREV CUR MEDS BY ELIG CLIN: HCPCS | Performed by: INTERNAL MEDICINE

## 2018-09-20 PROCEDURE — G8417 CALC BMI ABV UP PARAM F/U: HCPCS | Performed by: INTERNAL MEDICINE

## 2018-09-20 PROCEDURE — 1036F TOBACCO NON-USER: CPT | Performed by: INTERNAL MEDICINE

## 2018-09-20 PROCEDURE — G8431 POS CLIN DEPRES SCRN F/U DOC: HCPCS | Performed by: INTERNAL MEDICINE

## 2018-09-20 RX ORDER — VENLAFAXINE HYDROCHLORIDE 37.5 MG/1
37.5 CAPSULE, EXTENDED RELEASE ORAL DAILY
Qty: 30 CAPSULE | Refills: 3 | Status: SHIPPED | OUTPATIENT
Start: 2018-09-20 | End: 2019-01-31 | Stop reason: SDUPTHER

## 2018-10-15 ASSESSMENT — ENCOUNTER SYMPTOMS
BACK PAIN: 0
EYE REDNESS: 0
SHORTNESS OF BREATH: 0
COUGH: 0
SORE THROAT: 0
NAUSEA: 0
CHEST TIGHTNESS: 0
WHEEZING: 0
VOMITING: 0
DIARRHEA: 0
ABDOMINAL PAIN: 0
RHINORRHEA: 0
CONSTIPATION: 0
SINUS PRESSURE: 0

## 2018-10-26 ENCOUNTER — HOSPITAL ENCOUNTER (EMERGENCY)
Age: 43
Discharge: HOME | End: 2018-10-26
Payer: COMMERCIAL

## 2018-10-26 ENCOUNTER — APPOINTMENT (OUTPATIENT)
Dept: CT IMAGING | Age: 43
End: 2018-10-26
Payer: COMMERCIAL

## 2018-10-26 VITALS
HEART RATE: 98 BPM | RESPIRATION RATE: 16 BRPM | OXYGEN SATURATION: 99 % | DIASTOLIC BLOOD PRESSURE: 48 MMHG | WEIGHT: 287.48 LBS | TEMPERATURE: 98.2 F | SYSTOLIC BLOOD PRESSURE: 90 MMHG | BODY MASS INDEX: 38.1 KG/M2 | HEIGHT: 73 IN

## 2018-10-26 DIAGNOSIS — K52.9 COLITIS: Primary | ICD-10-CM

## 2018-10-26 DIAGNOSIS — Z99.2 ESRD ON HEMODIALYSIS (HCC): ICD-10-CM

## 2018-10-26 DIAGNOSIS — N18.6 ESRD ON HEMODIALYSIS (HCC): ICD-10-CM

## 2018-10-26 LAB
A/G RATIO: 1 (ref 1.1–2.2)
ALBUMIN SERPL-MCNC: 4 G/DL (ref 3.4–5)
ALP BLD-CCNC: 76 U/L (ref 40–129)
ALT SERPL-CCNC: 6 U/L (ref 10–40)
ANION GAP SERPL CALCULATED.3IONS-SCNC: 12 MMOL/L (ref 3–16)
AST SERPL-CCNC: 9 U/L (ref 15–37)
BASOPHILS ABSOLUTE: 0.1 K/UL (ref 0–0.2)
BASOPHILS RELATIVE PERCENT: 0.7 %
BILIRUB SERPL-MCNC: 0.5 MG/DL (ref 0–1)
BUN BLDV-MCNC: 8 MG/DL (ref 7–20)
CALCIUM SERPL-MCNC: 9.9 MG/DL (ref 8.3–10.6)
CHLORIDE BLD-SCNC: 91 MMOL/L (ref 99–110)
CO2: 31 MMOL/L (ref 21–32)
CREAT SERPL-MCNC: 5.4 MG/DL (ref 0.9–1.3)
EOSINOPHILS ABSOLUTE: 0.1 K/UL (ref 0–0.6)
EOSINOPHILS RELATIVE PERCENT: 0.9 %
GFR AFRICAN AMERICAN: 14
GFR NON-AFRICAN AMERICAN: 12
GLOBULIN: 4.1 G/DL
GLUCOSE BLD-MCNC: 89 MG/DL (ref 70–99)
HCT VFR BLD CALC: 41.5 % (ref 40.5–52.5)
HEMOGLOBIN: 13.6 G/DL (ref 13.5–17.5)
LIPASE: 24 U/L (ref 13–60)
LYMPHOCYTES ABSOLUTE: 1.3 K/UL (ref 1–5.1)
LYMPHOCYTES RELATIVE PERCENT: 11.4 %
MCH RBC QN AUTO: 29.8 PG (ref 26–34)
MCHC RBC AUTO-ENTMCNC: 32.9 G/DL (ref 31–36)
MCV RBC AUTO: 90.7 FL (ref 80–100)
MONOCYTES ABSOLUTE: 1.1 K/UL (ref 0–1.3)
MONOCYTES RELATIVE PERCENT: 9.5 %
NEUTROPHILS ABSOLUTE: 8.7 K/UL (ref 1.7–7.7)
NEUTROPHILS RELATIVE PERCENT: 77.5 %
PDW BLD-RTO: 16.6 % (ref 12.4–15.4)
PLATELET # BLD: 276 K/UL (ref 135–450)
PMV BLD AUTO: 7.5 FL (ref 5–10.5)
POTASSIUM SERPL-SCNC: 3.8 MMOL/L (ref 3.5–5.1)
RBC # BLD: 4.57 M/UL (ref 4.2–5.9)
SODIUM BLD-SCNC: 134 MMOL/L (ref 136–145)
TOTAL PROTEIN: 8.1 G/DL (ref 6.4–8.2)
WBC # BLD: 11.2 K/UL (ref 4–11)

## 2018-10-26 PROCEDURE — 80053 COMPREHEN METABOLIC PANEL: CPT

## 2018-10-26 PROCEDURE — 83690 ASSAY OF LIPASE: CPT

## 2018-10-26 PROCEDURE — 99284 EMERGENCY DEPT VISIT MOD MDM: CPT

## 2018-10-26 PROCEDURE — 85025 COMPLETE CBC W/AUTO DIFF WBC: CPT

## 2018-10-26 PROCEDURE — 74176 CT ABD & PELVIS W/O CONTRAST: CPT

## 2018-10-26 RX ORDER — METRONIDAZOLE 500 MG/1
500 TABLET ORAL 3 TIMES DAILY
Qty: 30 TABLET | Refills: 0 | Status: SHIPPED | OUTPATIENT
Start: 2018-10-26 | End: 2018-11-05

## 2018-10-26 RX ORDER — CIPROFLOXACIN 500 MG/1
500 TABLET, FILM COATED ORAL ONCE
Qty: 10 TABLET | Refills: 0 | Status: SHIPPED | OUTPATIENT
Start: 2018-10-26 | End: 2018-10-26

## 2018-10-26 RX ORDER — ONDANSETRON 4 MG/1
4-8 TABLET, FILM COATED ORAL EVERY 12 HOURS PRN
Qty: 30 TABLET | Refills: 0 | Status: SHIPPED | OUTPATIENT
Start: 2018-10-26 | End: 2019-06-06

## 2018-10-26 ASSESSMENT — PAIN DESCRIPTION - PROGRESSION: CLINICAL_PROGRESSION: GRADUALLY WORSENING

## 2018-10-26 ASSESSMENT — ENCOUNTER SYMPTOMS
DIARRHEA: 1
BACK PAIN: 0
SHORTNESS OF BREATH: 0
ABDOMINAL DISTENTION: 0
ABDOMINAL PAIN: 1
VOMITING: 0
COLOR CHANGE: 0
NAUSEA: 0
BLOOD IN STOOL: 0

## 2018-10-26 ASSESSMENT — PAIN SCALES - GENERAL
PAINLEVEL_OUTOF10: 7
PAINLEVEL_OUTOF10: 7

## 2018-10-26 ASSESSMENT — PAIN DESCRIPTION - ORIENTATION: ORIENTATION: LOWER

## 2018-10-26 ASSESSMENT — PAIN DESCRIPTION - LOCATION: LOCATION: ABDOMEN

## 2018-10-26 ASSESSMENT — PAIN DESCRIPTION - ONSET: ONSET: GRADUAL

## 2018-10-26 ASSESSMENT — PAIN DESCRIPTION - DESCRIPTORS: DESCRIPTORS: SHARP

## 2018-10-26 ASSESSMENT — PAIN DESCRIPTION - FREQUENCY: FREQUENCY: INTERMITTENT

## 2018-10-26 ASSESSMENT — PAIN DESCRIPTION - PAIN TYPE: TYPE: ACUTE PAIN

## 2018-10-26 NOTE — ED PROVIDER NOTES
**EVALUATED BY ADVANCED PRACTICE PROVIDER**        [facility]  eMERGENCY dEPARTMENT eNCOUnter      Pt Name: Silvano Zepeda  ZYY:[id number]  [place] 1975  Date of evaluation: 10/26/2018  Provider: NEHAL Marlow CNP      Chief Complaint:    Chief Complaint   Patient presents with    Abdominal Pain     lower abd pain since Tuesday, +n/v/d       Nursing Notes, Past Medical Hx, Past Surgical Hx, Social Hx, Allergies, and Family Hx were all reviewed and agreed with or any disagreements were addressed in the HPI.    HPI:  (Location, Duration, Timing, Severity,Quality, Assoc Sx, Context, Modifying factors)  This is a  44 y.o. male with history of ESRD on HD who presents to the emergency department today complaining of LLQ abdominal pain. Onset was 3 days ago. Symptoms started spontaneously and have been constant. He describes the pain as a sharp cramping pain and rates as 7/10. There are no exacerbating or alleviating factors. He states that 3 days ago he had 2 episodes of emesis but has not had any in the last 2 days. He states that for the last 3 days he has had several episodes of nonbloody watery stool daily. He denies fever and chills. Patient does not produce urine. Patient did dialysis Monday and today.     PastMedical/Surgical History:      Diagnosis Date    Dialysis patient [facility])     dialysis M-W-F    End stage renal disease ([facility] [address]     Focal chronic glomerulonephritis     Sleep apnea     does not use CPAP         Procedure Laterality Date    AV FISTULA CREATION      KIDNEY TRANSPLANT  2010    Failed    PARATHYROIDECTOMY  2008    SLEEVE GASTROPLASTY  2017       Medications:  Previous Medications    B COMPLEX VITAMINS PO    Take by mouth    CALCIUM ACETATE (PHOSLO) 667 MG CAPSULE    Take 2,001 mg by mouth    CINACALCET (SENSIPAR) 60 MG TABLET    Take 60 mg by mouth    EPOETIN TOMA (EPOGEN;PROCRIT) 98992 UNIT/ML INJECTION    Infuse 20,000 Units Psychiatric: He has a normal mood and affect. Nursing note and vitals reviewed. MEDICAL DECISION MAKING    Vitals:    Vitals:    10/26/18 1825 10/26/18 1953   Pulse: 113 98   Resp: 20 16   Temp: 97.6 °F (36.4 °C) 98.2 °F (36.8 °C)   TempSrc: Oral Oral   SpO2: 100% 99%   Weight: 287 lb 7.7 oz (130.4 kg)    Height: 6' 1\" (1.854 m)        LABS:  Labs Reviewed   COMPREHENSIVE METABOLIC PANEL - Abnormal; Notable for the following:        Result Value    Sodium 134 (*)     Chloride 91 (*)     CREATININE 5.4 (*)     GFR Non- 12 (*)     GFR African American 14 (*)     Albumin/Globulin Ratio 1.0 (*)     ALT 6 (*)     AST 9 (*)     All other components within normal limits    Narrative:     CALL  [facility] [phone],  Chemistry results called to and read back by Sherley Seth, 10/26/2018 19:31, by [facility]  Performed at:  [address] [address]   Phone [phone]   CBC WITH AUTO DIFFERENTIAL - Abnormal; Notable for the following:     WBC 11.2 (*)     RDW 16.6 (*)     Neutrophils # 8.7 (*)     All other components within normal limits    Narrative:     Performed at:  [address] [place]    Phone [phone]   LIPASE    Narrative:     Lisa Idol [id number],  Chemistry results called to and read back by Sherley Seth, 10/26/2018 19:31, by [facility]  Performed at:  [address] [address]   Phone ([phone] of labs reviewed and werenegative at this time or not returned at the time of this note.     RADIOLOGY:   Non-plain film images such as CT, Ultrasound and MRI are read by the radiologist. NEHAL Galan CNP have directly visualized the radiologic plain film image(s) with the below findings:        Interpretation per the Radiologist below, if available at the time of thisnote:    CT ABDOMEN PELVIS WO CONTRAST Additional Contrast? None   Final Result   Mild mural thickening of the large bowel, mainly on the right, with mild   pericolonic fat stranding, compatible with mild colitis. Otherwise, no acute   abnormality detected. No results found. MEDICAL DECISION MAKING / ED COURSE:      PROCEDURES:   Procedures    None    Patient was given:  Medications - No data to display    Differential diagnosis: Abdominal Aortic Aneurysm, Ischemic Bowel, Bowel Obstruction, Appendicitis, Diverticulitis, Pyelonephritis, UTI, STD, Ureterolithiasis, Colitis, Gonad Torsion, other    Patient seen and examined today for Abdominal pain and diarrhea. See HPI for patient presentation. Patient is hemodynamically stable, nontoxic, afebrile, and without tachycardia, tachypnea, and hypoxia. Physical exam as above. LLQ tenderness to palpation. No rigidity guarding or peritoneal signs. Overall this is a very well-appearing 42-year-old male. He is in no acute distress. CT shows uncomplicated colitis. He has no electrolyte abnormalities or liver dysfunction. He does have ESRD. Complete blood count shows very mild leukocytosis without bandemia. No anemia. Lipase is normal.    Patient be treated with Cipro and Flagyl for the colitis. I advised her to take these medications after his dialysis on his days that he goes to dialysis. He is nontoxic appearing and I feel he is appropriate and safe for discharge home at this time. I see nothing that would suggest an acute abdomen at this time. Based on history, physical exam, risk factors, and tests my suspicion for bowel obstruction, incarcerated hernia, acute pancreatitis, intra-abdominal abscess, perforated viscus, diverticulitis, cholecystitis, appendicitis, testicular torsion and cardiac ischemia is very low. There is no evidence of peritonitis, sepsis or toxicity at this time.  I feel the patient can be managed as an outpatient with

## 2018-11-20 ENCOUNTER — OFFICE VISIT (OUTPATIENT)
Dept: INTERNAL MEDICINE CLINIC | Age: 43
End: 2018-11-20
Payer: COMMERCIAL

## 2018-11-20 VITALS
OXYGEN SATURATION: 92 % | DIASTOLIC BLOOD PRESSURE: 70 MMHG | SYSTOLIC BLOOD PRESSURE: 122 MMHG | HEART RATE: 69 BPM | WEIGHT: 286 LBS | BODY MASS INDEX: 37.73 KG/M2

## 2018-11-20 DIAGNOSIS — I83.891 SYMPTOMATIC VARICOSE VEINS, RIGHT: ICD-10-CM

## 2018-11-20 DIAGNOSIS — K59.00 CONSTIPATION, UNSPECIFIED CONSTIPATION TYPE: Primary | ICD-10-CM

## 2018-11-20 PROCEDURE — G8482 FLU IMMUNIZE ORDER/ADMIN: HCPCS | Performed by: INTERNAL MEDICINE

## 2018-11-20 PROCEDURE — 99213 OFFICE O/P EST LOW 20 MIN: CPT | Performed by: INTERNAL MEDICINE

## 2018-11-20 PROCEDURE — 1036F TOBACCO NON-USER: CPT | Performed by: INTERNAL MEDICINE

## 2018-11-20 PROCEDURE — G8417 CALC BMI ABV UP PARAM F/U: HCPCS | Performed by: INTERNAL MEDICINE

## 2018-11-20 PROCEDURE — G8427 DOCREV CUR MEDS BY ELIG CLIN: HCPCS | Performed by: INTERNAL MEDICINE

## 2018-11-20 RX ORDER — LACTULOSE 10 G/15ML
30 SOLUTION ORAL NIGHTLY
Qty: 946 ML | Refills: 5 | Status: SHIPPED | OUTPATIENT
Start: 2018-11-20 | End: 2019-06-06

## 2018-11-20 NOTE — PROGRESS NOTES
Negative for redness. Respiratory: Negative for cough, chest tightness, shortness of breath and wheezing. Cardiovascular: Negative for chest pain, palpitations and leg swelling. Gastrointestinal: Positive for abdominal pain and constipation. Negative for diarrhea, nausea and vomiting. Genitourinary: Negative for dysuria and frequency. Musculoskeletal: Positive for gait problem and myalgias. Negative for arthralgias, back pain and joint swelling. Skin: Negative for rash. Neurological: Negative for dizziness, syncope and headaches. Objective:    Vitals:    11/20/18 1533   BP: 122/70   Pulse: 69   SpO2: 92%   Weight: 286 lb (129.7 kg)     Wt Readings from Last 3 Encounters:   11/20/18 286 lb (129.7 kg)   10/26/18 287 lb 7.7 oz (130.4 kg)   09/20/18 290 lb (131.5 kg)       Body mass index is 37.73 kg/m². Physical Exam   Constitutional: He appears well-developed and well-nourished. No distress. HENT:   Head: Normocephalic and atraumatic. Eyes: Pupils are equal, round, and reactive to light. EOM are normal.   Neck: Normal range of motion. Neck supple. Cardiovascular: Normal rate. No murmur heard. Pulmonary/Chest: Effort normal and breath sounds normal.   Abdominal: Soft. He exhibits no distension. There is no tenderness. Assessment:    1. Constipation, unspecified constipation typ    - lactulose (CHRONULAC) 10 GM/15ML solution; Take 45 mLs by mouth nightly  Dispense: 946 mL; Refill: 5    2. Symptomatic varicose veins, right    - Melinda - Zach Okeefe MD, Vascular Surgery, [facility]        Plan/Patient Instructions:    Patient Instructions   Consider a Light Box for seasonal affective disorder    Varicose veins/ superficial clot  Refer to vascular surgery     Constipation  Eat plenty of veggies  Start lactulose            Return in about 3 months (around 2/20/2019) for SAD.        [address]       Documentation was done using voice recognition dragon

## 2018-11-29 ENCOUNTER — TELEPHONE (OUTPATIENT)
Dept: INTERNAL MEDICINE CLINIC | Age: 43
End: 2018-11-29

## 2018-12-07 ENCOUNTER — TELEPHONE (OUTPATIENT)
Dept: PSYCHOLOGY | Age: 43
End: 2018-12-07

## 2018-12-12 PROBLEM — K59.00 CONSTIPATION: Status: ACTIVE | Noted: 2018-12-12

## 2018-12-12 PROBLEM — I83.891 SYMPTOMATIC VARICOSE VEINS, RIGHT: Status: ACTIVE | Noted: 2018-12-12

## 2018-12-12 ASSESSMENT — ENCOUNTER SYMPTOMS
BACK PAIN: 0
WHEEZING: 0
SHORTNESS OF BREATH: 0
DIARRHEA: 0
SORE THROAT: 0
EYE REDNESS: 0
CHEST TIGHTNESS: 0
NAUSEA: 0
CONSTIPATION: 1
VOMITING: 0
ABDOMINAL PAIN: 1
COUGH: 0
SINUS PRESSURE: 0
RHINORRHEA: 0

## 2019-01-31 DIAGNOSIS — F33.1 MODERATE EPISODE OF RECURRENT MAJOR DEPRESSIVE DISORDER (HCC): ICD-10-CM

## 2019-02-01 RX ORDER — VENLAFAXINE HYDROCHLORIDE 37.5 MG/1
37.5 CAPSULE, EXTENDED RELEASE ORAL DAILY
Qty: 30 CAPSULE | Refills: 0 | Status: SHIPPED | OUTPATIENT
Start: 2019-02-01 | End: 2019-02-28 | Stop reason: SDUPTHER

## 2019-02-28 DIAGNOSIS — F33.1 MODERATE EPISODE OF RECURRENT MAJOR DEPRESSIVE DISORDER (HCC): ICD-10-CM

## 2019-03-01 RX ORDER — VENLAFAXINE HYDROCHLORIDE 37.5 MG/1
37.5 CAPSULE, EXTENDED RELEASE ORAL DAILY
Qty: 30 CAPSULE | Refills: 0 | Status: SHIPPED | OUTPATIENT
Start: 2019-03-01 | End: 2019-04-07 | Stop reason: SDUPTHER

## 2019-04-07 DIAGNOSIS — F33.1 MODERATE EPISODE OF RECURRENT MAJOR DEPRESSIVE DISORDER (HCC): ICD-10-CM

## 2019-04-08 RX ORDER — VENLAFAXINE HYDROCHLORIDE 37.5 MG/1
37.5 CAPSULE, EXTENDED RELEASE ORAL DAILY
Qty: 30 CAPSULE | Refills: 2 | Status: SHIPPED | OUTPATIENT
Start: 2019-04-08 | End: 2019-06-06 | Stop reason: DRUGHIGH

## 2019-04-29 DIAGNOSIS — G47.00 INSOMNIA, UNSPECIFIED TYPE: ICD-10-CM

## 2019-04-29 RX ORDER — ZOLPIDEM TARTRATE 5 MG/1
TABLET ORAL
Qty: 30 TABLET | Refills: 0 | Status: SHIPPED | OUTPATIENT
Start: 2019-04-29 | End: 2020-02-21 | Stop reason: SDUPTHER

## 2019-06-06 ENCOUNTER — OFFICE VISIT (OUTPATIENT)
Dept: INTERNAL MEDICINE CLINIC | Age: 44
End: 2019-06-06
Payer: COMMERCIAL

## 2019-06-06 VITALS
HEART RATE: 84 BPM | DIASTOLIC BLOOD PRESSURE: 70 MMHG | SYSTOLIC BLOOD PRESSURE: 124 MMHG | OXYGEN SATURATION: 96 % | WEIGHT: 289 LBS | BODY MASS INDEX: 38.13 KG/M2

## 2019-06-06 DIAGNOSIS — L29.9 GENERALIZED PRURITUS: Primary | ICD-10-CM

## 2019-06-06 DIAGNOSIS — M87.9 OSTEONECROSIS OF BOTH HIPS (HCC): ICD-10-CM

## 2019-06-06 DIAGNOSIS — F33.1 MODERATE EPISODE OF RECURRENT MAJOR DEPRESSIVE DISORDER (HCC): ICD-10-CM

## 2019-06-06 DIAGNOSIS — G43.009 MIGRAINE WITHOUT AURA AND WITHOUT STATUS MIGRAINOSUS, NOT INTRACTABLE: ICD-10-CM

## 2019-06-06 DIAGNOSIS — H53.9 VISION DISTURBANCE: ICD-10-CM

## 2019-06-06 DIAGNOSIS — N25.0 RENAL OSTEODYSTROPHY: ICD-10-CM

## 2019-06-06 PROCEDURE — 99214 OFFICE O/P EST MOD 30 MIN: CPT | Performed by: INTERNAL MEDICINE

## 2019-06-06 PROCEDURE — G8417 CALC BMI ABV UP PARAM F/U: HCPCS | Performed by: INTERNAL MEDICINE

## 2019-06-06 PROCEDURE — 1036F TOBACCO NON-USER: CPT | Performed by: INTERNAL MEDICINE

## 2019-06-06 PROCEDURE — G8427 DOCREV CUR MEDS BY ELIG CLIN: HCPCS | Performed by: INTERNAL MEDICINE

## 2019-06-06 RX ORDER — HYDROXYZINE HYDROCHLORIDE 25 MG/1
25 TABLET, FILM COATED ORAL EVERY 8 HOURS PRN
Qty: 90 TABLET | Refills: 2 | Status: SHIPPED | OUTPATIENT
Start: 2019-06-06 | End: 2019-07-06

## 2019-06-06 RX ORDER — SUMATRIPTAN 50 MG/1
50 TABLET, FILM COATED ORAL
Qty: 27 TABLET | Refills: 1 | Status: SHIPPED | OUTPATIENT
Start: 2019-06-06 | End: 2020-04-16 | Stop reason: ALTCHOICE

## 2019-06-06 RX ORDER — FERRIC CITRATE 210 MG/1
TABLET, COATED ORAL
Refills: 2 | COMMUNITY
Start: 2019-05-30 | End: 2019-07-18 | Stop reason: ALTCHOICE

## 2019-06-06 RX ORDER — B,C/FOLIC/ZINC/SELENOMETH/D3/E 0.8-12.5MG
TABLET ORAL
Refills: 3 | COMMUNITY
Start: 2019-04-05

## 2019-06-06 RX ORDER — VENLAFAXINE HYDROCHLORIDE 75 MG/1
75 CAPSULE, EXTENDED RELEASE ORAL DAILY
Qty: 30 CAPSULE | Refills: 3 | Status: SHIPPED | OUTPATIENT
Start: 2019-06-06 | End: 2019-09-27 | Stop reason: SDUPTHER

## 2019-06-06 NOTE — PATIENT INSTRUCTIONS
Itching  Start Atarax    Osteonecrosis of hip  Refer to ortho    Depression  Increase Effexor to 75mg    Migraine/vision change   Start imitrex as needed  Refer to Ophthalmology

## 2019-06-06 NOTE — PROGRESS NOTES
[address] [address]  Phone: [phone]           Patient Name: Bridger Honeycutt    YOB: 1975    Today's Date: 6/6/19           Chief Complaint   Patient presents with    Knee Pain     right side    Leg Pain     right side    Other     all over body          Subjective:  Itching  Generalized pruritis 3-4 days  No change in dialysis (MWF)  No new lotions or soaps  He is on nasocort and mupirocin because of a perforated nasal septum per Otolaryngology   Benadryl helps  Right knee pain  Catches  No swelling  MRI of pelvis showed bilateral osteonecrosis   He has Renal osteodystrophy  Gets vitamin D IV   Nothing makes it better or worse           Vision change  Bright light makes it worse  He can only see red  Happening more frequently the last two months  They are associated with headaches  He has blurry vision then a  headache  Migraine  Left temple, does not radiate  He has photophobia  He has phonophobia  No vomiting  Takes tylenol without relief   Occurs 4 days per week    Last eye exam was years ago        History:     Past Medical History:   Diagnosis Date    Dialysis patient [facility])     dialysis M-W-F    End stage renal disease ([facility] [address]     Focal chronic glomerulonephritis     Sleep apnea     does not use CPAP       Current Outpatient Medications on File Prior to Visit   Medication Sig Dispense Refill    Multiple Vitamins-Minerals (RENAPLEX-D) TABS TAKE 1 TABLET BY MOUTH EVERY DAY  3    mupirocin (BACTROBAN) 2 % ointment APPLY TO NOSE BID UTD  0    B COMPLEX VITAMINS PO Take by mouth      epoetin boby (EPOGEN;PROCRIT) 31544 UNIT/ML injection Infuse 20,000 Units intravenously      mineral oil-hydrophilic petrolatum (AQUAPHOR) ointment Apply topically as needed. 396 g 11    AURYXIA 1  MG(Fe) TABS TAKE 2 TABLETS BY MOUTH THREE TIMES A DAY WITH MEALS.    SWALLOW WHOLE, DO NOT CHEW OR CRUSH MEDICATION  2     No current facility-administered medications on file prior to visit. Social History     Tobacco Use    Smoking status: Never Smoker    Smokeless tobacco: Never Used   Substance Use Topics    Alcohol use: Yes     Comment: rarely         Review of Systems:    Review of Systems    Objective:    Vitals:    06/06/19 0827   BP: 124/70   Pulse: 84   SpO2: 96%   Weight: 289 lb (131.1 kg)     Wt Readings from Last 3 Encounters:   06/06/19 289 lb (131.1 kg)   11/20/18 286 lb (129.7 kg)   10/26/18 287 lb 7.7 oz (130.4 kg)       Body mass index is 38.13 kg/m². Physical Exam   Constitutional: He appears well-developed and well-nourished. No distress. Tired appearing    HENT:   Head: Normocephalic and atraumatic. Right Ear: Hearing, tympanic membrane, external ear and ear canal normal.   Left Ear: Hearing, tympanic membrane, external ear and ear canal normal.   Nose: Nose normal. No mucosal edema or rhinorrhea. Mouth/Throat: Oropharynx is clear and moist and mucous membranes are normal.   Eyes: Pupils are equal, round, and reactive to light. No scleral icterus. Neck: No thyroid mass and no thyromegaly present. Cardiovascular: Normal rate, regular rhythm, S1 normal, S2 normal, normal heart sounds and intact distal pulses. No murmur heard. Pulmonary/Chest: Effort normal and breath sounds normal. He has no decreased breath sounds. He has no wheezes. He has no rhonchi. He has no rales. Abdominal: Soft. Normal appearance and bowel sounds are normal. There is no hepatosplenomegaly. There is no tenderness. Musculoskeletal: He exhibits no edema or deformity. Right hip: He exhibits normal range of motion and normal strength. Left hip: He exhibits normal range of motion and normal strength. Lymphadenopathy:     He has no cervical adenopathy. Neurological: He is alert. No cranial nerve deficit or sensory deficit. Gait normal.   Skin: Skin is warm and dry. Rash noted. Rash is papular (neck). Psychiatric: He exhibits a depressed mood. Assessment:    1. Generalized pruritus  Trial of atarax   - hydrOXYzine (ATARAX) 25 MG tablet; Take 1 tablet by mouth every 8 hours as needed for Itching  Dispense: 90 tablet; Refill: 2    2. Osteonecrosis of both hips ([address]  Refer to ortho   - Jus Buckley MD, Orthopedic Surgery, [facility]    3. Renal osteodystrophy  Followed by Nephrology   - venlafaxine (EFFEXOR XR) 75 MG extended release capsule; Take 1 capsule by mouth daily  Dispense: 30 capsule; Refill: 3    4. Moderate episode of recurrent major depressive disorder (HCC)  Increase Effexor     5. Migraine without aura and without status migrainosus, not intractable  Trial of sumatriptan  Rec eye exam   - AFL - Danyel Lane MD, Ophthalmology, [facility]  - SUMAtriptan (IMITREX) 50 MG tablet; Take 1 tablet by mouth once as needed for Migraine Repeat in two hours if no improvement. Max 2 tab/24h  Dispense: 27 tablet; Refill: 1    6. Vision disturbance  Refer to Ophthalmology   - Breanne Lux MD, Ophthalmology, [facility]        Plan/Patient Instructions:    Patient Instructions   Itching  Start Atarax    Osteonecrosis of hip  Refer to ortho    Depression  Increase Effexor to 75mg    Migraine/vision change   Start imitrex as needed  Refer to Ophthalmology         Return in about 6 weeks (around 7/18/2019) for Migraine, depression . [address]       Documentation was done using voice recognition dragon software. Every effort was made to ensure accuracy; however, inadvertent, unintentional computerized transcription errors may be present.

## 2019-07-18 ENCOUNTER — OFFICE VISIT (OUTPATIENT)
Dept: INTERNAL MEDICINE CLINIC | Age: 44
End: 2019-07-18
Payer: COMMERCIAL

## 2019-07-18 ENCOUNTER — OFFICE VISIT (OUTPATIENT)
Dept: PSYCHOLOGY | Age: 44
End: 2019-07-18
Payer: COMMERCIAL

## 2019-07-18 VITALS
SYSTOLIC BLOOD PRESSURE: 110 MMHG | HEART RATE: 78 BPM | BODY MASS INDEX: 39.32 KG/M2 | OXYGEN SATURATION: 96 % | WEIGHT: 298 LBS | DIASTOLIC BLOOD PRESSURE: 80 MMHG

## 2019-07-18 DIAGNOSIS — F33.1 MODERATE EPISODE OF RECURRENT MAJOR DEPRESSIVE DISORDER (HCC): Primary | ICD-10-CM

## 2019-07-18 DIAGNOSIS — Z13.31 POSITIVE DEPRESSION SCREENING: ICD-10-CM

## 2019-07-18 DIAGNOSIS — G43.009 MIGRAINE WITHOUT AURA AND WITHOUT STATUS MIGRAINOSUS, NOT INTRACTABLE: ICD-10-CM

## 2019-07-18 PROCEDURE — G8431 POS CLIN DEPRES SCRN F/U DOC: HCPCS | Performed by: INTERNAL MEDICINE

## 2019-07-18 PROCEDURE — G8417 CALC BMI ABV UP PARAM F/U: HCPCS | Performed by: INTERNAL MEDICINE

## 2019-07-18 PROCEDURE — 99213 OFFICE O/P EST LOW 20 MIN: CPT | Performed by: INTERNAL MEDICINE

## 2019-07-18 PROCEDURE — 90832 PSYTX W PT 30 MINUTES: CPT | Performed by: PSYCHOLOGIST

## 2019-07-18 PROCEDURE — 1036F TOBACCO NON-USER: CPT | Performed by: INTERNAL MEDICINE

## 2019-07-18 PROCEDURE — G8427 DOCREV CUR MEDS BY ELIG CLIN: HCPCS | Performed by: INTERNAL MEDICINE

## 2019-07-18 RX ORDER — SEVELAMER CARBONATE 800 MG/1
TABLET, FILM COATED ORAL
Refills: 3 | COMMUNITY
Start: 2019-06-17

## 2019-07-18 ASSESSMENT — PATIENT HEALTH QUESTIONNAIRE - PHQ9
SUM OF ALL RESPONSES TO PHQ QUESTIONS 1-9: 14
SUM OF ALL RESPONSES TO PHQ9 QUESTIONS 1 & 2: 0
4. FEELING TIRED OR HAVING LITTLE ENERGY: 3
6. FEELING BAD ABOUT YOURSELF - OR THAT YOU ARE A FAILURE OR HAVE LET YOURSELF OR YOUR FAMILY DOWN: 0
2. FEELING DOWN, DEPRESSED OR HOPELESS: 0
9. THOUGHTS THAT YOU WOULD BE BETTER OFF DEAD, OR OF HURTING YOURSELF: 0
3. TROUBLE FALLING OR STAYING ASLEEP: 3
10. IF YOU CHECKED OFF ANY PROBLEMS, HOW DIFFICULT HAVE THESE PROBLEMS MADE IT FOR YOU TO DO YOUR WORK, TAKE CARE OF THINGS AT HOME, OR GET ALONG WITH OTHER PEOPLE: 0
8. MOVING OR SPEAKING SO SLOWLY THAT OTHER PEOPLE COULD HAVE NOTICED. OR THE OPPOSITE, BEING SO FIGETY OR RESTLESS THAT YOU HAVE BEEN MOVING AROUND A LOT MORE THAN USUAL: 3
5. POOR APPETITE OR OVEREATING: 2
7. TROUBLE CONCENTRATING ON THINGS, SUCH AS READING THE NEWSPAPER OR WATCHING TELEVISION: 3
SUM OF ALL RESPONSES TO PHQ QUESTIONS 1-9: 14
1. LITTLE INTEREST OR PLEASURE IN DOING THINGS: 0

## 2019-07-18 ASSESSMENT — ENCOUNTER SYMPTOMS
WHEEZING: 0
SINUS PRESSURE: 0
ABDOMINAL PAIN: 0
EYE REDNESS: 0
RHINORRHEA: 0
COUGH: 0
SHORTNESS OF BREATH: 0
SORE THROAT: 0
CONSTIPATION: 0
VOMITING: 0
BACK PAIN: 0
DIARRHEA: 0
CHEST TIGHTNESS: 0
NAUSEA: 0

## 2019-07-18 NOTE — PROGRESS NOTES
[address] [place] [address] [address]   Phone: [phone]           Patient Name: Yanelis Echols    YOB: 1975    Today's Date: 7/18/19           Chief Complaint   Patient presents with    Depression    Migraine          Subjective:  Stopped the [place] and it helped the itching    Depression  Patient says he does not do anything. He is not motivated. He looks around his bedroom and sees that it needs to be cleaned well to do it. He still is having difficulty sleeping. He does not have a set bedtime. He tries to lay down at 11 PM, however he will lay there for hours. Eventually he just picks up his phone. On Monday Wednesday and Friday he has to wake up at 4:45 AM.  The other days he wakes up at 10:23 AM.  It is always 10:23. He does nap during dialysis sessions for about 90 minutes. He says he spends his day sitting, eating, and drinking. He is not following a meal plan. He has to lose an additional 30 pounds before he can be listed for a kidney transplant. Migraine  None recently       History:     Past Medical History:   Diagnosis Date    Dialysis patient [facility])     dialysis M-W-F    End stage renal disease ([facility] [address]     Focal chronic glomerulonephritis     Sleep apnea     does not use CPAP       Current Outpatient Medications on File Prior to Visit   Medication Sig Dispense Refill    sevelamer (RENVELA) 800 MG tablet TAKE 2 TABLETS BY MOUTH THREE TIMES DAILY WITH MEALS AND 1 TABLET TWICE DAILY WITH SNACKS  3    Multiple Vitamins-Minerals (RENAPLEX-D) TABS TAKE 1 TABLET BY MOUTH EVERY DAY  3    venlafaxine (EFFEXOR XR) 75 MG extended release capsule Take 1 capsule by mouth daily 30 capsule 3    SUMAtriptan (IMITREX) 50 MG tablet Take 1 tablet by mouth once as needed for Migraine Repeat in two hours if no improvement.  Max 2 tab/24h 27 tablet 1    epoetin boby (EPOGEN;PROCRIT) 16976 UNIT/ML injection Infuse 20,000 Units intravenously      mineral oil-hydrophilic petrolatum (AQUAPHOR) ointment Apply topically as needed. 396 g 11     No current facility-administered medications on file prior to visit. Social History     Tobacco Use    Smoking status: Never Smoker    Smokeless tobacco: Never Used   Substance Use Topics    Alcohol use: Yes     Comment: rarely         Review of Systems:    Review of Systems   Constitutional: Negative for fatigue and fever. HENT: Negative for ear pain, hearing loss, postnasal drip, rhinorrhea, sinus pressure, sore throat and tinnitus. Eyes: Negative for redness. Respiratory: Negative for cough, chest tightness, shortness of breath and wheezing. Cardiovascular: Negative for chest pain, palpitations and leg swelling. Gastrointestinal: Negative for abdominal pain, constipation, diarrhea, nausea and vomiting. Genitourinary: Negative for dysuria and frequency. Musculoskeletal: Negative for arthralgias, back pain and joint swelling. Skin: Negative for rash. Neurological: Negative for dizziness, syncope and headaches. Psychiatric/Behavioral: Positive for dysphoric mood and sleep disturbance. Objective:    Vitals:    07/18/19 0959   BP: 110/80   Pulse: 78   SpO2: 96%   Weight: 298 lb (135.2 kg)     Wt Readings from Last 3 Encounters:   07/18/19 298 lb (135.2 kg)   06/06/19 289 lb (131.1 kg)   11/20/18 286 lb (129.7 kg)       Body mass index is 39.32 kg/m². Physical Exam   Constitutional: He appears well-developed and well-nourished. No distress. HENT:   Head: Normocephalic and atraumatic. Right Ear: Hearing, tympanic membrane, external ear and ear canal normal.   Left Ear: Hearing, tympanic membrane, external ear and ear canal normal.   Nose: Nose normal. No mucosal edema or rhinorrhea. Mouth/Throat: Oropharynx is clear and moist and mucous membranes are normal.   Eyes: Pupils are equal, round, and reactive to light. No scleral icterus.    Neck: No thyroid mass

## 2019-07-18 NOTE — PROGRESS NOTES
coherent  Associations    logical connections, tangential connections  Insight    Good  Judgment    Intact  Orientation    oriented to person, place, time, and general circumstances  Memory    recent and remote memory intact  Attention/Concentration    intact  Morbid ideation No  Suicide Assessment    no suicidal ideation    History:    Social History:   Social History     Socioeconomic History    Marital status: Single     Spouse name: Not on file    Number of children: Not on file    Years of education: Not on file    Highest education level: Not on file   Occupational History    Not on file   Social Needs    Financial resource strain: Not on file    Food insecurity:     Worry: Not on file     Inability: Not on file    Transportation needs:     Medical: Not on file     Non-medical: Not on file   Tobacco Use    Smoking status: Never Smoker    Smokeless tobacco: Never Used   Substance and Sexual Activity    Alcohol use: Yes     Comment: rarely    Drug use: No    Sexual activity: Never   Lifestyle    Physical activity:     Days per week: Not on file     Minutes per session: Not on file    Stress: Not on file   Relationships    Social connections:     Talks on phone: Not on file     Gets together: Not on file     Attends Judaism service: Not on file     Active member of club or organization: Not on file     Attends meetings of clubs or organizations: Not on file     Relationship status: Not on file    Intimate partner violence:     Fear of current or ex partner: Not on file     Emotionally abused: Not on file     Physically abused: Not on file     Forced sexual activity: Not on file   Other Topics Concern    Not on file   Social History Narrative    Not on file     TOBACCO:   reports that he has never smoked. He has never used smokeless tobacco.  ETOH:   reports that he drinks alcohol. A:  Patient engaged and cooperative. Denies SI. Insight and motivation are good.      Diagnosis:    Major

## 2019-08-08 ENCOUNTER — OFFICE VISIT (OUTPATIENT)
Dept: PSYCHOLOGY | Age: 44
End: 2019-08-08
Payer: COMMERCIAL

## 2019-08-08 DIAGNOSIS — F33.1 MODERATE EPISODE OF RECURRENT MAJOR DEPRESSIVE DISORDER (HCC): Primary | ICD-10-CM

## 2019-08-08 PROCEDURE — 90832 PSYTX W PT 30 MINUTES: CPT | Performed by: PSYCHOLOGIST

## 2019-08-08 ASSESSMENT — PATIENT HEALTH QUESTIONNAIRE - PHQ9
SUM OF ALL RESPONSES TO PHQ QUESTIONS 1-9: 13
2. FEELING DOWN, DEPRESSED OR HOPELESS: 0
5. POOR APPETITE OR OVEREATING: 3
1. LITTLE INTEREST OR PLEASURE IN DOING THINGS: 2
3. TROUBLE FALLING OR STAYING ASLEEP: 3
8. MOVING OR SPEAKING SO SLOWLY THAT OTHER PEOPLE COULD HAVE NOTICED. OR THE OPPOSITE, BEING SO FIGETY OR RESTLESS THAT YOU HAVE BEEN MOVING AROUND A LOT MORE THAN USUAL: 0
10. IF YOU CHECKED OFF ANY PROBLEMS, HOW DIFFICULT HAVE THESE PROBLEMS MADE IT FOR YOU TO DO YOUR WORK, TAKE CARE OF THINGS AT HOME, OR GET ALONG WITH OTHER PEOPLE: 1
4. FEELING TIRED OR HAVING LITTLE ENERGY: 3
9. THOUGHTS THAT YOU WOULD BE BETTER OFF DEAD, OR OF HURTING YOURSELF: 0
SUM OF ALL RESPONSES TO PHQ QUESTIONS 1-9: 13
SUM OF ALL RESPONSES TO PHQ9 QUESTIONS 1 & 2: 2
7. TROUBLE CONCENTRATING ON THINGS, SUCH AS READING THE NEWSPAPER OR WATCHING TELEVISION: 2
6. FEELING BAD ABOUT YOURSELF - OR THAT YOU ARE A FAILURE OR HAVE LET YOURSELF OR YOUR FAMILY DOWN: 0

## 2019-09-03 ENCOUNTER — OFFICE VISIT (OUTPATIENT)
Dept: PSYCHOLOGY | Age: 44
End: 2019-09-03
Payer: COMMERCIAL

## 2019-09-03 DIAGNOSIS — F33.1 MAJOR DEPRESSIVE DISORDER, RECURRENT EPISODE, MODERATE (HCC): Primary | ICD-10-CM

## 2019-09-03 PROCEDURE — 90832 PSYTX W PT 30 MINUTES: CPT | Performed by: PSYCHOLOGIST

## 2019-09-03 NOTE — PATIENT INSTRUCTIONS
1) Thursday go see Terrence Lamb at [place] [facility] to discuss how to eat healthy on a budget    2) Start eating breakfast and lunch     3) Think about downloading Cheat Day, Mealviser, Moderation    4) Add a walk and think about tying to something you do each day like eat breakfast.

## 2019-09-17 ENCOUNTER — OFFICE VISIT (OUTPATIENT)
Dept: PSYCHOLOGY | Age: 44
End: 2019-09-17
Payer: COMMERCIAL

## 2019-09-17 DIAGNOSIS — F33.1 MODERATE EPISODE OF RECURRENT MAJOR DEPRESSIVE DISORDER (HCC): Primary | ICD-10-CM

## 2019-09-17 PROCEDURE — 90832 PSYTX W PT 30 MINUTES: CPT | Performed by: PSYCHOLOGIST

## 2019-09-17 NOTE — PROGRESS NOTES
motivation are good. PHQ Scores 8/8/2019 7/18/2019 4/10/2018   PHQ2 Score 2 0 1   PHQ9 Score 13 14 12     Interpretation of Total Score Depression Severity: 1-4 = Minimal depression, 5-9 = Mild depression, 10-14 = Moderate depression, 15-19 = Moderately severe depression, 20-27 = Severe depression      Diagnosis:    Major Depressive Disorder, moderate     Plan:    Goal: Enhance capacity to manage mood  Objective 1: Increase engagement in bx activation (6 months)  Objective 2: Utilize cognitive restructuring techniques to manage negative cognitions (6 months)  Objective 3: Increase engagement in self-care activities (6 months)    Goal: Improve healthy living bxs  Objective 1: Improve sleep hygiene practices (6 months)  Objective 2: Increase activity and healthy eating bxs (6 months)    Goal: Improve interpersonal interactions  Objective 1: Learn and utilize effective communication strategies (6 months)  Objective 2: Implement boundaries and engage in limit setting (6 months)    Pt interventions:  Discussed various factors related to the development and maintenance of  depression (including biological, cognitive, behavioral, and environmental factors), Trained in strategies for increasing balanced thinking, Discussed and set plan for behavioral activation, Trained in improving communication skills, Provided education, Discussed self-care (sleep, nutrition, rewarding activities, social support, exercise), Discussed potential barriers to change, Trained in effective parenting skills (positive reinforcement, routine, limit setting with consequences, time out, praise, mood management, sleep hygiene, social activities, pleasurable activities, physicial activities, problem solving), Established rapport, Supportive techniques and Identified maladaptive thoughts    Documentation was done using voice recognition dragon software.   Every effort was made to ensure accuracy; however, inadvertent, unintentional computerized

## 2019-09-23 ENCOUNTER — OFFICE VISIT (OUTPATIENT)
Dept: ORTHOPEDIC SURGERY | Age: 44
End: 2019-09-23
Payer: COMMERCIAL

## 2019-09-23 VITALS
SYSTOLIC BLOOD PRESSURE: 108 MMHG | HEART RATE: 74 BPM | BODY MASS INDEX: 39.14 KG/M2 | HEIGHT: 72 IN | WEIGHT: 289 LBS | DIASTOLIC BLOOD PRESSURE: 75 MMHG

## 2019-09-23 DIAGNOSIS — M25.551 BILATERAL HIP PAIN: Primary | ICD-10-CM

## 2019-09-23 DIAGNOSIS — M70.61 TROCHANTERIC BURSITIS OF RIGHT HIP: ICD-10-CM

## 2019-09-23 DIAGNOSIS — M25.552 BILATERAL HIP PAIN: Primary | ICD-10-CM

## 2019-09-23 PROCEDURE — 20610 DRAIN/INJ JOINT/BURSA W/O US: CPT | Performed by: ORTHOPAEDIC SURGERY

## 2019-09-23 PROCEDURE — G8417 CALC BMI ABV UP PARAM F/U: HCPCS | Performed by: ORTHOPAEDIC SURGERY

## 2019-09-23 PROCEDURE — 1036F TOBACCO NON-USER: CPT | Performed by: ORTHOPAEDIC SURGERY

## 2019-09-23 PROCEDURE — G8427 DOCREV CUR MEDS BY ELIG CLIN: HCPCS | Performed by: ORTHOPAEDIC SURGERY

## 2019-09-23 PROCEDURE — 99202 OFFICE O/P NEW SF 15 MIN: CPT | Performed by: ORTHOPAEDIC SURGERY

## 2019-09-24 RX ORDER — BETAMETHASONE SODIUM PHOSPHATE AND BETAMETHASONE ACETATE 3; 3 MG/ML; MG/ML
6 INJECTION, SUSPENSION INTRA-ARTICULAR; INTRALESIONAL; INTRAMUSCULAR; SOFT TISSUE ONCE
Status: SHIPPED | OUTPATIENT
Start: 2019-09-24

## 2019-09-26 DIAGNOSIS — N25.0 RENAL OSTEODYSTROPHY: ICD-10-CM

## 2019-09-27 RX ORDER — VENLAFAXINE HYDROCHLORIDE 75 MG/1
75 CAPSULE, EXTENDED RELEASE ORAL DAILY
Qty: 90 CAPSULE | Refills: 1 | Status: SHIPPED | OUTPATIENT
Start: 2019-09-27

## 2019-10-15 ENCOUNTER — OFFICE VISIT (OUTPATIENT)
Dept: INTERNAL MEDICINE CLINIC | Age: 44
End: 2019-10-15
Payer: COMMERCIAL

## 2019-10-15 VITALS
WEIGHT: 291 LBS | SYSTOLIC BLOOD PRESSURE: 106 MMHG | BODY MASS INDEX: 39.47 KG/M2 | DIASTOLIC BLOOD PRESSURE: 64 MMHG | OXYGEN SATURATION: 97 % | HEART RATE: 72 BPM

## 2019-10-15 DIAGNOSIS — F33.1 MODERATE EPISODE OF RECURRENT MAJOR DEPRESSIVE DISORDER (HCC): Primary | ICD-10-CM

## 2019-10-15 DIAGNOSIS — G47.00 INSOMNIA, UNSPECIFIED TYPE: ICD-10-CM

## 2019-10-15 DIAGNOSIS — R63.5 ABNORMAL WEIGHT GAIN: ICD-10-CM

## 2019-10-15 PROCEDURE — 1036F TOBACCO NON-USER: CPT | Performed by: INTERNAL MEDICINE

## 2019-10-15 PROCEDURE — G8417 CALC BMI ABV UP PARAM F/U: HCPCS | Performed by: INTERNAL MEDICINE

## 2019-10-15 PROCEDURE — 99214 OFFICE O/P EST MOD 30 MIN: CPT | Performed by: INTERNAL MEDICINE

## 2019-10-15 PROCEDURE — G8484 FLU IMMUNIZE NO ADMIN: HCPCS | Performed by: INTERNAL MEDICINE

## 2019-10-15 PROCEDURE — G8427 DOCREV CUR MEDS BY ELIG CLIN: HCPCS | Performed by: INTERNAL MEDICINE

## 2019-10-15 ASSESSMENT — ENCOUNTER SYMPTOMS
BACK PAIN: 0
SORE THROAT: 0
SINUS PRESSURE: 0
ABDOMINAL PAIN: 0
DIARRHEA: 0
NAUSEA: 0
RHINORRHEA: 0
VOMITING: 0
WHEEZING: 0
CHEST TIGHTNESS: 0
CONSTIPATION: 0
COUGH: 0
EYE REDNESS: 0
SHORTNESS OF BREATH: 0

## 2019-10-31 ENCOUNTER — TELEPHONE (OUTPATIENT)
Dept: INTERNAL MEDICINE CLINIC | Age: 44
End: 2019-10-31

## 2019-11-13 ENCOUNTER — OFFICE VISIT (OUTPATIENT)
Dept: ORTHOPEDIC SURGERY | Age: 44
End: 2019-11-13
Payer: COMMERCIAL

## 2019-11-13 VITALS — BODY MASS INDEX: 39.42 KG/M2 | WEIGHT: 291 LBS | HEIGHT: 72 IN

## 2019-11-13 DIAGNOSIS — M54.16 RIGHT LUMBAR RADICULOPATHY: Primary | ICD-10-CM

## 2019-11-13 PROCEDURE — 1036F TOBACCO NON-USER: CPT | Performed by: PHYSICIAN ASSISTANT

## 2019-11-13 PROCEDURE — G8484 FLU IMMUNIZE NO ADMIN: HCPCS | Performed by: PHYSICIAN ASSISTANT

## 2019-11-13 PROCEDURE — G8417 CALC BMI ABV UP PARAM F/U: HCPCS | Performed by: PHYSICIAN ASSISTANT

## 2019-11-13 PROCEDURE — G8427 DOCREV CUR MEDS BY ELIG CLIN: HCPCS | Performed by: PHYSICIAN ASSISTANT

## 2019-11-13 PROCEDURE — 99213 OFFICE O/P EST LOW 20 MIN: CPT | Performed by: PHYSICIAN ASSISTANT

## 2019-11-13 RX ORDER — METHYLPREDNISOLONE 4 MG/1
TABLET ORAL
Qty: 1 KIT | Refills: 0 | Status: SHIPPED | OUTPATIENT
Start: 2019-11-13 | End: 2020-01-14

## 2019-12-19 ENCOUNTER — OFFICE VISIT (OUTPATIENT)
Dept: INTERNAL MEDICINE CLINIC | Age: 44
End: 2019-12-19
Payer: COMMERCIAL

## 2019-12-19 VITALS — SYSTOLIC BLOOD PRESSURE: 78 MMHG | BODY MASS INDEX: 38.35 KG/M2 | DIASTOLIC BLOOD PRESSURE: 36 MMHG | WEIGHT: 282.8 LBS

## 2019-12-19 DIAGNOSIS — Z00.00 ROUTINE GENERAL MEDICAL EXAMINATION AT A HEALTH CARE FACILITY: Primary | ICD-10-CM

## 2019-12-19 PROCEDURE — G8484 FLU IMMUNIZE NO ADMIN: HCPCS | Performed by: INTERNAL MEDICINE

## 2019-12-19 PROCEDURE — G0438 PPPS, INITIAL VISIT: HCPCS | Performed by: INTERNAL MEDICINE

## 2019-12-19 ASSESSMENT — LIFESTYLE VARIABLES
HOW OFTEN DURING THE LAST YEAR HAVE YOU FOUND THAT YOU WERE NOT ABLE TO STOP DRINKING ONCE YOU HAD STARTED: 0
HOW OFTEN DO YOU HAVE SIX OR MORE DRINKS ON ONE OCCASION: 0
HOW OFTEN DO YOU HAVE A DRINK CONTAINING ALCOHOL: 1
HOW OFTEN DURING THE LAST YEAR HAVE YOU FAILED TO DO WHAT WAS NORMALLY EXPECTED FROM YOU BECAUSE OF DRINKING: 0
HOW MANY STANDARD DRINKS CONTAINING ALCOHOL DO YOU HAVE ON A TYPICAL DAY: 0
AUDIT TOTAL SCORE: 1
HAS A RELATIVE, FRIEND, DOCTOR, OR ANOTHER HEALTH PROFESSIONAL EXPRESSED CONCERN ABOUT YOUR DRINKING OR SUGGESTED YOU CUT DOWN: 0
HAVE YOU OR SOMEONE ELSE BEEN INJURED AS A RESULT OF YOUR DRINKING: 0
HOW OFTEN DURING THE LAST YEAR HAVE YOU HAD A FEELING OF GUILT OR REMORSE AFTER DRINKING: 0
HOW OFTEN DURING THE LAST YEAR HAVE YOU NEEDED AN ALCOHOLIC DRINK FIRST THING IN THE MORNING TO GET YOURSELF GOING AFTER A NIGHT OF HEAVY DRINKING: 0
HOW OFTEN DURING THE LAST YEAR HAVE YOU BEEN UNABLE TO REMEMBER WHAT HAPPENED THE NIGHT BEFORE BECAUSE YOU HAD BEEN DRINKING: 0
AUDIT-C TOTAL SCORE: 1

## 2019-12-19 ASSESSMENT — PATIENT HEALTH QUESTIONNAIRE - PHQ9
SUM OF ALL RESPONSES TO PHQ QUESTIONS 1-9: 1
SUM OF ALL RESPONSES TO PHQ QUESTIONS 1-9: 1

## 2020-01-07 ENCOUNTER — OFFICE VISIT (OUTPATIENT)
Dept: PSYCHOLOGY | Age: 45
End: 2020-01-07
Payer: COMMERCIAL

## 2020-01-07 PROCEDURE — 90832 PSYTX W PT 30 MINUTES: CPT | Performed by: PSYCHOLOGIST

## 2020-01-07 NOTE — PROGRESS NOTES
Behavioral Health Consultation  Donald Aguayo, Ph.D.  Psychologist  1/7/2020  11:57 AM      Time spent with Patient: 30 minutes  This is patient's 4th [facility] appointment. Reason for Consult:        Chief Complaint   Patient presents with    Depression      Referring Provider: Estrella Boucher MD  [address] [facility], [address] [place] [address]    Pt provided informed consent for the behavioral health program. Discussed with patient model of service to include the limits of confidentiality (i.e. abuse reporting, suicide  intervention, etc.) and short-term intervention focused approach. Pt indicated understanding. Feedback given to PCP. S:  Pt seen per PCP re: depression      Pt seen for f/u. Pt stated that he engaged in a fasting program and noticed an improvement in his mood and his physical energy. Pt reported that he has backslid since stopping the program. Explored the most helpful parts of the program and Pt identified having an eating schedule as it also helped regulate his sleep. Pt has been eating much more frequently at night and in higher volumes. Set goal for Pt to stop eating at 9:00 pm and he set a timer in his phone. Pt feels motivated to increase bx activation and would like to take a weekly exercise class. Used problem focused coping to address barriers. Pt has free access to exercise center from his insurance company. Pt set goal to complete form after the appt.    O:  MSE:    Appearance    alert, cooperative,   Impulsive behavior No  Speech    normal rate, normal volume and well articulated  Mood    Depressed  Affect    depressed affect  Thought Content    cognitive distortions  Thought Process    linear and coherent  Associations    logical connections, tangential connections  Insight    Good  Judgment    Intact  Orientation    oriented to person, place, time, and general circumstances  Memory    recent and remote memory intact  Attention/Concentration    intact  Morbid ideation No  Suicide Assessment    no suicidal ideation    History:    Social History:   Social History     Socioeconomic History    Marital status: Single     Spouse name: Not on file    Number of children: Not on file    Years of education: Not on file    Highest education level: Not on file   Occupational History    Not on file   Social Needs    Financial resource strain: Not on file    Food insecurity:     Worry: Not on file     Inability: Not on file    Transportation needs:     Medical: Not on file     Non-medical: Not on file   Tobacco Use    Smoking status: Never Smoker    Smokeless tobacco: Never Used   Substance and Sexual Activity    Alcohol use: Yes     Comment: rarely    Drug use: No    Sexual activity: Never   Lifestyle    Physical activity:     Days per week: Not on file     Minutes per session: Not on file    Stress: Not on file   Relationships    Social connections:     Talks on phone: Not on file     Gets together: Not on file     Attends [employer]ian service: Not on file     Active member of club or organization: Not on file     Attends meetings of clubs or organizations: Not on file     Relationship status: Not on file    Intimate partner violence:     Fear of current or ex partner: Not on file     Emotionally abused: Not on file     Physically abused: Not on file     Forced sexual activity: Not on file   Other Topics Concern    Not on file   Social History Narrative    Not on file     TOBACCO:   reports that he has never smoked. He has never used smokeless tobacco.  ETOH:   reports current alcohol use. A:  Patient engaged and cooperative. Denies SI. Insight and motivation are good. Diagnosis:    Major Depressive Disorder, moderate     Plan:    Goal: Enhance capacity to manage mood  Objective 1: Increase engagement in bx activation (6 months)  Objective 2: Utilize cognitive restructuring techniques to manage negative cognitions (6 months)  Objective 3:  Increase engagement in self-care

## 2020-01-14 ENCOUNTER — OFFICE VISIT (OUTPATIENT)
Dept: INTERNAL MEDICINE CLINIC | Age: 45
End: 2020-01-14
Payer: COMMERCIAL

## 2020-01-14 ENCOUNTER — OFFICE VISIT (OUTPATIENT)
Dept: PSYCHOLOGY | Age: 45
End: 2020-01-14
Payer: COMMERCIAL

## 2020-01-14 VITALS
HEART RATE: 97 BPM | OXYGEN SATURATION: 98 % | SYSTOLIC BLOOD PRESSURE: 122 MMHG | DIASTOLIC BLOOD PRESSURE: 78 MMHG | BODY MASS INDEX: 39.87 KG/M2 | WEIGHT: 294 LBS

## 2020-01-14 PROCEDURE — G8417 CALC BMI ABV UP PARAM F/U: HCPCS | Performed by: INTERNAL MEDICINE

## 2020-01-14 PROCEDURE — 99214 OFFICE O/P EST MOD 30 MIN: CPT | Performed by: INTERNAL MEDICINE

## 2020-01-14 PROCEDURE — 90832 PSYTX W PT 30 MINUTES: CPT | Performed by: PSYCHOLOGIST

## 2020-01-14 PROCEDURE — 1036F TOBACCO NON-USER: CPT | Performed by: INTERNAL MEDICINE

## 2020-01-14 PROCEDURE — G8484 FLU IMMUNIZE NO ADMIN: HCPCS | Performed by: INTERNAL MEDICINE

## 2020-01-14 PROCEDURE — G8427 DOCREV CUR MEDS BY ELIG CLIN: HCPCS | Performed by: INTERNAL MEDICINE

## 2020-01-14 RX ORDER — AMOXICILLIN AND CLAVULANATE POTASSIUM 500; 125 MG/1; MG/1
1 TABLET, FILM COATED ORAL DAILY
Qty: 10 TABLET | Refills: 0 | Status: SHIPPED | OUTPATIENT
Start: 2020-01-14 | End: 2020-01-24

## 2020-01-14 NOTE — PROGRESS NOTES
[address] [facility] [address]  Phone: [phone]           Patient Name: Andreas Duncan    YOB: 1975    Today's Date: 1/14/20           Chief Complaint   Patient presents with    Depression    Cough          Subjective:  Patient was doing a 16/8 intermittent fasting. He felt better, more energy, was sleeping better. Was using \"The Faster Way\"   Drinking a cup of soda per day again- Dr. Fields Ni   He eliminated sugar and dairy   He was supposed to eat more veggies      Breakfast  Three eggs, scrambled with Devonda Cassi with cheese, [place], pepperoni, x2 slices     Snack  Tostitos chips     Lunch  None     Snack   Half of a hot dog      Mood  Slightly deteriorated. Patient attributes this to fatigue. He is tired because he is not sleeping as well. Continues in psychotherapy. History:     Past Medical History:   Diagnosis Date    Dialysis patient [facility])     dialysis M-W-F    End stage renal disease ([facility] [address]     Focal chronic glomerulonephritis     Sleep apnea     does not use CPAP       Current Outpatient Medications on File Prior to Visit   Medication Sig Dispense Refill    venlafaxine (EFFEXOR XR) 75 MG extended release capsule TAKE 1 CAPSULE BY MOUTH DAILY 90 capsule 1    sevelamer (RENVELA) 800 MG tablet TAKE 2 TABLETS BY MOUTH THREE TIMES DAILY WITH MEALS AND 1 TABLET TWICE DAILY WITH SNACKS  3    Multiple Vitamins-Minerals (RENAPLEX-D) TABS TAKE 1 TABLET BY MOUTH EVERY DAY  3    SUMAtriptan (IMITREX) 50 MG tablet Take 1 tablet by mouth once as needed for Migraine Repeat in two hours if no improvement. Max 2 tab/24h 27 tablet 1    epoetin boby (EPOGEN;PROCRIT) 62842 UNIT/ML injection Infuse 20,000 Units intravenously      mineral oil-hydrophilic petrolatum (AQUAPHOR) ointment Apply topically as needed.  396 g 11     Current Facility-Administered Medications on File Prior to Visit   Medication Dose Route

## 2020-01-14 NOTE — PATIENT INSTRUCTIONS
1) Get asparagus, broccoli, raw carrots, cuties, apples. 2) Go do some instacart so that you can get a small refrigerator. Then you can get cold items.   3) Start pairing vegetables and fruits with every meal.

## 2020-01-15 NOTE — PROGRESS NOTES
Behavioral Health Consultation  Spenser Holloway, Ph.D.  Psychologist  1/15/2020  10:37 AM      Time spent with Patient: 30 minutes  This is patient's 4th [facility] appointment. Reason for Consult:        Chief Complaint   Patient presents with    Depression      Referring Provider: Volanda Boxer, MD  [address] [employer]  [facility], [address]    Pt provided informed consent for the behavioral health program. Discussed with patient model of service to include the limits of confidentiality (i.e. abuse reporting, suicide  intervention, etc.) and short-term intervention focused approach. Pt indicated understanding. Feedback given to PCP. S:  Pt seen per PCP re: depression      Pt seen for f/u. Pts weight has increased which he stated he was expecting. Examined Pts motivation for bx change. Pt noted he is 10/10 motivated to lose weight and increase his physical activity. Explored barriers which include his continued challenges with his living situation. Pt noted that when he does buy vegetables and fruits that the family that he is living with eats them \"before he can have a bite\". Used problem focused coping to address these barriers. Pt plans to buy the vegetables that they do not like and fruits that do not have to be refrigerated until he can buy a refrigerator in his room. Pt set goal to do instacart for the next few days until he acquires the funds needed for a fridge. Pt did sign up at the  and plans to go this Saturday for his fitness assessment.    O:  MSE:    Appearance    alert, cooperative,   Impulsive behavior No  Speech    normal rate, normal volume and well articulated  Mood    Depressed  Affect    depressed affect  Thought Content    cognitive distortions  Thought Process    linear and coherent  Associations    logical connections, tangential connections  Insight    Good  Judgment    Intact  Orientation    oriented to person, place, time, and general circumstances  Memory    recent and remote memory intact  Attention/Concentration    intact  Morbid ideation No  Suicide Assessment    no suicidal ideation    History:    Social History:   Social History     Socioeconomic History    Marital status: Single     Spouse name: Not on file    Number of children: Not on file    Years of education: Not on file    Highest education level: Not on file   Occupational History    Not on file   Social Needs    Financial resource strain: Not on file    Food insecurity:     Worry: Not on file     Inability: Not on file    Transportation needs:     Medical: Not on file     Non-medical: Not on file   Tobacco Use    Smoking status: Never Smoker    Smokeless tobacco: Never Used   Substance and Sexual Activity    Alcohol use: Yes     Comment: rarely    Drug use: No    Sexual activity: Never   Lifestyle    Physical activity:     Days per week: Not on file     Minutes per session: Not on file    Stress: Not on file   Relationships    Social connections:     Talks on phone: Not on file     Gets together: Not on file     Attends Restorationist service: Not on file     Active member of club or organization: Not on file     Attends meetings of clubs or organizations: Not on file     Relationship status: Not on file    Intimate partner violence:     Fear of current or ex partner: Not on file     Emotionally abused: Not on file     Physically abused: Not on file     Forced sexual activity: Not on file   Other Topics Concern    Not on file   Social History Narrative    Not on file     TOBACCO:   reports that he has never smoked. He has never used smokeless tobacco.  ETOH:   reports current alcohol use. A:  Patient engaged and cooperative. Denies SI. Insight and motivation are good. Diagnosis:    Major Depressive Disorder, moderate     Plan:    Goal: Enhance capacity to manage mood  Objective 1:  Increase engagement in bx activation (6 months)  Objective 2: Utilize cognitive restructuring techniques to manage negative

## 2020-01-28 ENCOUNTER — OFFICE VISIT (OUTPATIENT)
Dept: PSYCHOLOGY | Age: 45
End: 2020-01-28
Payer: COMMERCIAL

## 2020-01-28 PROCEDURE — 90832 PSYTX W PT 30 MINUTES: CPT | Performed by: PSYCHOLOGIST

## 2020-01-28 NOTE — PROGRESS NOTES
intact  Attention/Concentration    intact  Morbid ideation No  Suicide Assessment    no suicidal ideation    History:    Social History:   Social History     Socioeconomic History    Marital status: Single     Spouse name: Not on file    Number of children: Not on file    Years of education: Not on file    Highest education level: Not on file   Occupational History    Not on file   Social Needs    Financial resource strain: Not on file    Food insecurity:     Worry: Not on file     Inability: Not on file    Transportation needs:     Medical: Not on file     Non-medical: Not on file   Tobacco Use    Smoking status: Never Smoker    Smokeless tobacco: Never Used   Substance and Sexual Activity    Alcohol use: Yes     Comment: rarely    Drug use: No    Sexual activity: Never   Lifestyle    Physical activity:     Days per week: Not on file     Minutes per session: Not on file    Stress: Not on file   Relationships    Social connections:     Talks on phone: Not on file     Gets together: Not on file     Attends Church service: Not on file     Active member of club or organization: Not on file     Attends meetings of clubs or organizations: Not on file     Relationship status: Not on file    Intimate partner violence:     Fear of current or ex partner: Not on file     Emotionally abused: Not on file     Physically abused: Not on file     Forced sexual activity: Not on file   Other Topics Concern    Not on file   Social History Narrative    Not on file     TOBACCO:   reports that he has never smoked. He has never used smokeless tobacco.  ETOH:   reports current alcohol use. A:  Patient engaged and cooperative. Denies SI. Insight and motivation are good. Diagnosis:    Major Depressive Disorder, moderate     Plan:    Goal: Enhance capacity to manage mood  Objective 1:  Increase engagement in bx activation (6 months)  Objective 2: Utilize cognitive restructuring techniques to manage negative

## 2020-02-18 ENCOUNTER — OFFICE VISIT (OUTPATIENT)
Dept: PSYCHOLOGY | Age: 45
End: 2020-02-18
Payer: COMMERCIAL

## 2020-02-18 PROCEDURE — 90832 PSYTX W PT 30 MINUTES: CPT | Performed by: PSYCHOLOGIST

## 2020-02-18 NOTE — PROGRESS NOTES
hygiene practices (6 months)  Objective 2: Increase activity and healthy eating bxs (6 months)    Goal: Improve interpersonal interactions  Objective 1: Learn and utilize effective communication strategies (6 months)  Objective 2: Implement boundaries and engage in limit setting (6 months)    Pt interventions:  Discussed various factors related to the development and maintenance of  depression (including biological, cognitive, behavioral, and environmental factors), Discussed and set plan for behavioral activation, Provided education, Discussed self-care (sleep, nutrition, rewarding activities, social support, exercise), Discussed potential barriers to change, Established rapport and Supportive techniques    Documentation was done using voice recognition dragon software. Every effort was made to ensure accuracy; however, inadvertent, unintentional computerized transcription errors may be present.

## 2020-03-10 ENCOUNTER — OFFICE VISIT (OUTPATIENT)
Dept: PSYCHOLOGY | Age: 45
End: 2020-03-10
Payer: COMMERCIAL

## 2020-03-10 PROCEDURE — 90832 PSYTX W PT 30 MINUTES: CPT | Performed by: PSYCHOLOGIST

## 2020-03-10 ASSESSMENT — PATIENT HEALTH QUESTIONNAIRE - PHQ9
7. TROUBLE CONCENTRATING ON THINGS, SUCH AS READING THE NEWSPAPER OR WATCHING TELEVISION: 3
2. FEELING DOWN, DEPRESSED OR HOPELESS: 0
5. POOR APPETITE OR OVEREATING: 2
SUM OF ALL RESPONSES TO PHQ QUESTIONS 1-9: 15
SUM OF ALL RESPONSES TO PHQ QUESTIONS 1-9: 15
SUM OF ALL RESPONSES TO PHQ9 QUESTIONS 1 & 2: 3
6. FEELING BAD ABOUT YOURSELF - OR THAT YOU ARE A FAILURE OR HAVE LET YOURSELF OR YOUR FAMILY DOWN: 0
4. FEELING TIRED OR HAVING LITTLE ENERGY: 3
9. THOUGHTS THAT YOU WOULD BE BETTER OFF DEAD, OR OF HURTING YOURSELF: 0
1. LITTLE INTEREST OR PLEASURE IN DOING THINGS: 3
8. MOVING OR SPEAKING SO SLOWLY THAT OTHER PEOPLE COULD HAVE NOTICED. OR THE OPPOSITE, BEING SO FIGETY OR RESTLESS THAT YOU HAVE BEEN MOVING AROUND A LOT MORE THAN USUAL: 1
3. TROUBLE FALLING OR STAYING ASLEEP: 3

## 2020-03-10 NOTE — PROGRESS NOTES
Plan:    Goal: Enhance capacity to manage mood  Objective 1: Increase engagement in bx activation (6 months)  Objective 2: Utilize cognitive restructuring techniques to manage negative cognitions (6 months)  Objective 3: Increase engagement in self-care activities (6 months)    Goal: Improve healthy living bxs  Objective 1: Improve sleep hygiene practices (6 months)  Objective 2: Increase activity and healthy eating bxs (6 months)    Goal: Improve interpersonal interactions  Objective 1: Learn and utilize effective communication strategies (6 months)  Objective 2: Implement boundaries and engage in limit setting (6 months)    Pt interventions:  Discussed various factors related to the development and maintenance of  depression (including biological, cognitive, behavioral, and environmental factors), Discussed and set plan for behavioral activation, Provided education, Discussed self-care (sleep, nutrition, rewarding activities, social support, exercise), Discussed potential barriers to change, Established rapport and Supportive techniques    Documentation was done using voice recognition dragon software. Every effort was made to ensure accuracy; however, inadvertent, unintentional computerized transcription errors may be present.

## 2020-03-31 ENCOUNTER — VIRTUAL VISIT (OUTPATIENT)
Dept: PSYCHOLOGY | Age: 45
End: 2020-03-31
Payer: COMMERCIAL

## 2020-03-31 PROCEDURE — 90832 PSYTX W PT 30 MINUTES: CPT | Performed by: PSYCHOLOGIST

## 2020-03-31 NOTE — PROGRESS NOTES
Behavioral Health Consultation  Kelin Phillips, Ph.D.  Psychologist  3/31/2020  11:57 AM      Time spent with Patient: 30 minutes  This is patient's 6th [facility] appointment. Reason for Consult:        Chief Complaint   Patient presents with    Depression      Referring Provider: Ayden Guerrero MD  [facility], [address] [place] [address]    Pt provided informed consent for the behavioral health program. Discussed with patient model of service to include the limits of confidentiality (i.e. abuse reporting, suicide  intervention, etc.) and short-term intervention focused approach. Pt indicated understanding. Feedback given to PCP. Pt seen via [email or web address] (a secure video platform) for a telepsychological visit d/t Coronavirus pandemic. Pt was positively identified and alternate means of communication and emergency contact were verified within the patient demographic information of their EMR. A telephone number to our office was provided and a plan was set if their were to be a disruption of services. Consent for the telepsychological visit was verbally obtained and we reviewed the limitations and innovative nature of using distance technology. Risks to confidentiality were discussed and Pt identified that they were taking the call at home and indicated the no other persons were in the room with them I was in our office at [facility]. Discussed billing. S:  Pt seen per PCP re: depression      Pt seen for f/u. Pts weight has decreased to 292. Pt noted that he has been cooking more frequently. Pt stated that he has been doing video chats to stay in touch with friends during social distancing and has found this beneficial. Pt noted that he created a vision board with friends and discussed his goals--stay in contact with family, work towards independent remote working, learn Syrian, get a transplant, lose weight.  Focused intervention on discussing actionable steps to meet these

## 2020-04-16 ENCOUNTER — VIRTUAL VISIT (OUTPATIENT)
Dept: INTERNAL MEDICINE CLINIC | Age: 45
End: 2020-04-16
Payer: COMMERCIAL

## 2020-04-16 VITALS — DIASTOLIC BLOOD PRESSURE: 67 MMHG | BODY MASS INDEX: 39.56 KG/M2 | WEIGHT: 291.67 LBS | SYSTOLIC BLOOD PRESSURE: 97 MMHG

## 2020-04-16 PROCEDURE — G8417 CALC BMI ABV UP PARAM F/U: HCPCS | Performed by: INTERNAL MEDICINE

## 2020-04-16 PROCEDURE — G8427 DOCREV CUR MEDS BY ELIG CLIN: HCPCS | Performed by: INTERNAL MEDICINE

## 2020-04-16 PROCEDURE — 99213 OFFICE O/P EST LOW 20 MIN: CPT | Performed by: INTERNAL MEDICINE

## 2020-04-16 PROCEDURE — 1036F TOBACCO NON-USER: CPT | Performed by: INTERNAL MEDICINE

## 2020-04-16 ASSESSMENT — PATIENT HEALTH QUESTIONNAIRE - PHQ9
1. LITTLE INTEREST OR PLEASURE IN DOING THINGS: 0
SUM OF ALL RESPONSES TO PHQ QUESTIONS 1-9: 0
SUM OF ALL RESPONSES TO PHQ QUESTIONS 1-9: 0
2. FEELING DOWN, DEPRESSED OR HOPELESS: 0
SUM OF ALL RESPONSES TO PHQ9 QUESTIONS 1 & 2: 0

## 2020-04-16 NOTE — PROGRESS NOTES
conducted, with patient's consent, to reduce the patient's risk of exposure to COVID-19 and provide continuity of care for an established patient. Services were provided through a video synchronous discussion virtually to substitute for in-person clinic visit.

## 2020-04-28 ENCOUNTER — VIRTUAL VISIT (OUTPATIENT)
Dept: PSYCHOLOGY | Age: 45
End: 2020-04-28
Payer: COMMERCIAL

## 2020-04-28 PROCEDURE — 90832 PSYTX W PT 30 MINUTES: CPT | Performed by: PSYCHOLOGIST

## 2020-04-28 NOTE — PROGRESS NOTES
Behavioral Health Consultation  Sara Archibald, Ph.D.  Psychologist  4/28/2020  11:40 AM      Time spent with Patient: 30 minutes  This is patient's 7th [facility] appointment. Reason for Consult:        Chief Complaint   Patient presents with    Depression      Referring Provider: Patty Adkins MD  [facility], [address] [place] [address]    Pt provided informed consent for the behavioral health program. Discussed with patient model of service to include the limits of confidentiality (i.e. abuse reporting, suicide  intervention, etc.) and short-term intervention focused approach. Pt indicated understanding. Feedback given to PCP. Pt seen via [email or web address] (a secure video platform) for a telepsychological visit d/t Coronavirus pandemic. Pt was positively identified and alternate means of communication and emergency contact were verified within the patient demographic information of their EMR. A telephone number to our office was provided and a plan was set if their were to be a disruption of services. Consent for the telepsychological visit was verbally obtained and we reviewed the limitations and innovative nature of using distance technology. Risks to confidentiality were discussed and Pt identified that they were taking the call at home and indicated the no other persons were in the room with them       S:  Pt seen per PCP re: depression      Pt seen for f/u. Patient reported that he had been doing well with decreasing his weight however recently slipped and has increased his weight again. Patient stated that he has a tendency to a hard time re-engaging in positive behavior change when he has a challenging day. Patient was unable to identify reasons why this occurs. Continue to explore patient's challenges in engaging in behavior change. Discussed patient's tendency to engage in emotional avoidance and explored how this may affect his willingness to engage in behavior change.   Focused intervention on mindfulness particularly how to use this skill when he has a bad day's that he is able to reengage in his behavior change the next day versus backsliding as well as cognitive restructuring of maladaptive thoughts.   O:  MSE:    Appearance    alert, cooperative,   Impulsive behavior No  Speech    normal rate, normal volume and well articulated  Mood    Depressed  Affect    depressed affect  Thought Content    cognitive distortions  Thought Process    linear and coherent  Associations    logical connections, tangential connections  Insight    Good  Judgment    Intact  Orientation    oriented to person, place, time, and general circumstances  Memory    recent and remote memory intact  Attention/Concentration    intact  Morbid ideation No  Suicide Assessment    no suicidal ideation    History:    Social History:   Social History     Socioeconomic History    Marital status: Single     Spouse name: Not on file    Number of children: Not on file    Years of education: Not on file    Highest education level: Not on file   Occupational History    Not on file   Social Needs    Financial resource strain: Not on file    Food insecurity     Worry: Not on file     Inability: Not on file    Transportation needs     Medical: Not on file     Non-medical: Not on file   Tobacco Use    Smoking status: Never Smoker    Smokeless tobacco: Never Used   Substance and Sexual Activity    Alcohol use: Yes     Comment: rarely    Drug use: No    Sexual activity: Never   Lifestyle    Physical activity     Days per week: Not on file     Minutes per session: Not on file    Stress: Not on file   Relationships    Social connections     Talks on phone: Not on file     Gets together: Not on file     Attends Faith service: Not on file     Active member of club or organization: Not on file     Attends meetings of clubs or organizations: Not on file     Relationship status: Not on file    Intimate partner violence     Fear of current or ex partner: Not on file     Emotionally abused: Not on file     Physically abused: Not on file     Forced sexual activity: Not on file   Other Topics Concern    Not on file   Social History Narrative    Not on file     TOBACCO:   reports that he has never smoked. He has never used smokeless tobacco.  ETOH:   reports current alcohol use. A:  Patient engaged and cooperative. Denies SI. Insight and motivation are good. PHQ Scores 4/16/2020 3/10/2020 12/19/2019 8/8/2019 7/18/2019 4/10/2018   PHQ2 Score 0 3 1 2 0 1   PHQ9 Score 0 15 1 13 14 12     Interpretation of Total Score Depression Severity: 1-4 = Minimal depression, 5-9 = Mild depression, 10-14 = Moderate depression, 15-19 = Moderately severe depression, 20-27 = Severe depression      Diagnosis:    Major Depressive Disorder, moderate     Plan:    Goal: Enhance capacity to manage mood  Objective 1: Increase engagement in bx activation (6 months)  Objective 2: Utilize cognitive restructuring techniques to manage negative cognitions (6 months)  Objective 3: Increase engagement in self-care activities (6 months)    Goal: Improve healthy living bxs  Objective 1: Improve sleep hygiene practices (6 months)  Objective 2: Increase activity and healthy eating bxs (6 months)    Goal: Improve interpersonal interactions  Objective 1: Learn and utilize effective communication strategies (6 months)  Objective 2: Implement boundaries and engage in limit setting (6 months)    Pt interventions: Focused intervention on mindfulness particularly how to use this skill when he has a bad day's that he is able to reengage in his behavior change the next day versus backsliding as well as cognitive restructuring of maladaptive thoughts. Documentation was done using voice recognition dragon software. Every effort was made to ensure accuracy; however, inadvertent, unintentional computerized transcription errors may be present.

## 2020-05-19 ENCOUNTER — VIRTUAL VISIT (OUTPATIENT)
Dept: PSYCHOLOGY | Age: 45
End: 2020-05-19
Payer: COMMERCIAL

## 2020-05-19 PROCEDURE — 90832 PSYTX W PT 30 MINUTES: CPT | Performed by: PSYCHOLOGIST

## 2020-05-19 NOTE — PROGRESS NOTES
phone: Not on file     Gets together: Not on file     Attends Church service: Not on file     Active member of club or organization: Not on file     Attends meetings of clubs or organizations: Not on file     Relationship status: Not on file    Intimate partner violence     Fear of current or ex partner: Not on file     Emotionally abused: Not on file     Physically abused: Not on file     Forced sexual activity: Not on file   Other Topics Concern    Not on file   Social History Narrative    Not on file     TOBACCO:   reports that he has never smoked. He has never used smokeless tobacco.  ETOH:   reports current alcohol use. A:  Patient engaged and cooperative. Denies SI. Insight and motivation are good. Diagnosis:    Major Depressive Disorder, moderate     Plan:    Goal: Enhance capacity to manage mood  Objective 1: Increase engagement in bx activation (6 months)  Objective 2: Utilize cognitive restructuring techniques to manage negative cognitions (6 months)  Objective 3: Increase engagement in self-care activities (6 months)    Goal: Improve healthy living bxs  Objective 1: Improve sleep hygiene practices (6 months)  Objective 2: Increase activity and healthy eating bxs (6 months)    Goal: Improve interpersonal interactions  Objective 1: Learn and utilize effective communication strategies (6 months)  Objective 2: Implement boundaries and engage in limit setting (6 months)    Pt interventions: Focused intervention on reinforcement of iterative behavior change plans, problem solved perceived and potential barriers to behavioral goals, reinforced cognitive restructuring, discussed how to use perspective taking to address disappointment when he does not meet goals. Documentation was done using voice recognition dragon software. Every effort was made to ensure accuracy; however, inadvertent, unintentional computerized transcription errors may be present.

## 2020-06-04 ENCOUNTER — VIRTUAL VISIT (OUTPATIENT)
Dept: INTERNAL MEDICINE CLINIC | Age: 45
End: 2020-06-04
Payer: COMMERCIAL

## 2020-06-04 PROCEDURE — 99213 OFFICE O/P EST LOW 20 MIN: CPT | Performed by: INTERNAL MEDICINE

## 2020-06-04 PROCEDURE — G8427 DOCREV CUR MEDS BY ELIG CLIN: HCPCS | Performed by: INTERNAL MEDICINE

## 2020-06-04 PROCEDURE — 1036F TOBACCO NON-USER: CPT | Performed by: INTERNAL MEDICINE

## 2020-06-04 PROCEDURE — G8417 CALC BMI ABV UP PARAM F/U: HCPCS | Performed by: INTERNAL MEDICINE

## 2020-06-04 ASSESSMENT — ENCOUNTER SYMPTOMS
RESPIRATORY NEGATIVE: 1
EYES NEGATIVE: 1
BACK PAIN: 1

## 2020-06-23 ENCOUNTER — VIRTUAL VISIT (OUTPATIENT)
Dept: PSYCHOLOGY | Age: 45
End: 2020-06-23
Payer: COMMERCIAL

## 2020-06-23 PROCEDURE — 90832 PSYTX W PT 30 MINUTES: CPT | Performed by: PSYCHOLOGIST

## 2020-06-24 NOTE — PROGRESS NOTES
looking forward to moving as he has a more positive support and that he has made plans to engage in physical activity with various friends up there. Patient has not began preparing effectively for the move and feels overwhelmed. Focused intervention on creating plan to help with feelings of being overwhelmed including task prioritization, utilize restructuring to manage maladaptive thoughts, and engage patient in perspective taking to address ruminative cognitions.   O:  MSE:    Appearance    alert, cooperative,   Impulsive behavior No  Speech    normal rate, normal volume and well articulated  Mood    euthymic  Affect    congruent  Thought Content    cognitive distortions  Thought Process    linear and coherent  Associations    logical connections, tangential connections  Insight    Good  Judgment    Intact  Orientation    oriented to person, place, time, and general circumstances  Memory    recent and remote memory intact  Attention/Concentration    intact  Morbid ideation No  Suicide Assessment    no suicidal ideation    History:    Social History:   Social History     Socioeconomic History    Marital status: Single     Spouse name: Not on file    Number of children: Not on file    Years of education: Not on file    Highest education level: Not on file   Occupational History    Not on file   Social Needs    Financial resource strain: Not on file    Food insecurity     Worry: Not on file     Inability: Not on file   [employer] needs     Medical: Not on file     Non-medical: Not on file   Tobacco Use    Smoking status: Never Smoker    Smokeless tobacco: Never Used   Substance and Sexual Activity    Alcohol use: Yes     Comment: rarely    Drug use: No    Sexual activity: Never   Lifestyle    Physical activity     Days per week: Not on file     Minutes per session: Not on file    Stress: Not on file   Relationships    Social connections     Talks on phone: Not on file     Gets together: Not on

## 2020-07-02 ENCOUNTER — HOSPITAL ENCOUNTER (OUTPATIENT)
Dept: MRI IMAGING | Age: 45
Discharge: HOME | End: 2020-07-02
Payer: COMMERCIAL

## 2020-07-02 PROCEDURE — 72148 MRI LUMBAR SPINE W/O DYE: CPT

## 2020-07-07 ENCOUNTER — TELEPHONE (OUTPATIENT)
Dept: INTERNAL MEDICINE CLINIC | Age: 45
End: 2020-07-07

## 2020-07-07 NOTE — TELEPHONE ENCOUNTER
Called to review MRI results. No answer. Will send [employer].    Patient will need referral to ortho- spine

## 2020-07-09 ENCOUNTER — PATIENT MESSAGE (OUTPATIENT)
Dept: INTERNAL MEDICINE CLINIC | Age: 45
End: 2020-07-09

## 2020-07-09 NOTE — TELEPHONE ENCOUNTER
From: Temi Bundy  To: Serena Mcneil MD  Sent: 7/9/2020 9:21 AM EDT  Subject: Non-Urgent Medical Question    Hello,     I think I didn't tell you the date of the move, only that I was just moving. I need two referrals, I would like to see someone in [place] for the MRI results because that seems like it may be a frequent visits and do not want to drive back and forth for that. Also need a referral to my old nephrologist here in [place] if that is also possible. His name is Dr. Butch Ram - phone is [phone] and fax is [phone]. He is part of [place]. If you need more information let me know.  Thank you    Alana

## 2020-08-21 ENCOUNTER — OFFICE VISIT (OUTPATIENT)
Dept: PSYCHOLOGY | Age: 45
End: 2020-08-21
Payer: COMMERCIAL

## 2020-08-21 PROCEDURE — 90832 PSYTX W PT 30 MINUTES: CPT | Performed by: PSYCHOLOGIST

## 2020-08-21 NOTE — PROGRESS NOTES
Behavioral Health Consultation  Alem Lehman, Ph.D.  Claudette Book  8/21/2020  12:17 PM      Time spent with Patient: 30 minutes      Reason for Consult:        Chief Complaint   Patient presents with    Depression      Referring Provider: Josee Lucas MD  [address] [facility], [address] [place] [address]    Pt provided informed consent for the behavioral health program. Discussed with patient model of service to include the limits of confidentiality (i.e. abuse reporting, suicide  intervention, etc.) and short-term intervention focused approach. Pt indicated understanding. Feedback given to PCP. Pt seen via [email or web address] (a secure video platform) for a telepsychological visit d/t Coronavirus pandemic. Pt was positively identified and alternate means of communication and emergency contact were verified within the patient demographic information of their EMR. A telephone number to our office was provided and a plan was set if their were to be a disruption of services. Consent for the telepsychological visit was verbally obtained and we reviewed the limitations and innovative nature of using distance technology. Risks to confidentiality were discussed and Pt identified that they were taking the call at home and indicated the no other persons were in the room with them       S:  Pt seen per PCP re: depression      Pt seen for f/u. Patient reported an increase in depressive symptoms. Patient noted that he has not been able to engage as much in behavioral activation due to his legs feeling numb. Patient described that when he walks his legs go numb and that he feels as if he is going to fall over. Patient noted that he has fallen because of this. Patient noted that he is spending most of his day in bed which has had a negative effect on his mood.   Focused intervention on restructuring maladaptive thoughts, engage patient in problem focused coping to get health needs met, discussed ways in which patient can engage in behavioral activation within his home given his current limitations.   O:  MSE:    Appearance    alert, cooperative,   Impulsive behavior No  Speech    normal rate, normal volume and well articulated  Mood    euthymic  Affect    congruent  Thought Content    cognitive distortions  Thought Process    linear and coherent  Associations    logical connections, tangential connections  Insight    Good  Judgment    Intact  Orientation    oriented to person, place, time, and general circumstances  Memory    recent and remote memory intact  Attention/Concentration    intact  Morbid ideation No  Suicide Assessment    no suicidal ideation    History:    Social History:   Social History     Socioeconomic History    Marital status: Single     Spouse name: Not on file    Number of children: Not on file    Years of education: Not on file    Highest education level: Not on file   Occupational History    Not on file   Social Needs    Financial resource strain: Not on file    Food insecurity     Worry: Not on file     Inability: Not on file    Transportation needs     Medical: Not on file     Non-medical: Not on file   Tobacco Use    Smoking status: Never Smoker    Smokeless tobacco: Never Used   Substance and Sexual Activity    Alcohol use: Yes     Comment: rarely    Drug use: No    Sexual activity: Never   Lifestyle    Physical activity     Days per week: Not on file     Minutes per session: Not on file    Stress: Not on file   Relationships    Social connections     Talks on phone: Not on file     Gets together: Not on file     Attends Gnosticist service: Not on file     Active member of club or organization: Not on file     Attends meetings of clubs or organizations: Not on file     Relationship status: Not on file    Intimate partner violence     Fear of current or ex partner: Not on file     Emotionally abused: Not on file     Physically abused: Not on file     Forced sexual activity: Not on file   Other Topics Concern    Not on file   Social History Narrative    Not on file     TOBACCO:   reports that he has never smoked. He has never used smokeless tobacco.  ETOH:   reports current alcohol use. A:  Patient engaged and cooperative. Denies SI. Insight and motivation are good. Diagnosis:    Major Depressive Disorder, moderate     Plan:    Goal: Enhance capacity to manage mood  Objective 1: Increase engagement in bx activation (6 months)  Objective 2: Utilize cognitive restructuring techniques to manage negative cognitions (6 months)  Objective 3: Increase engagement in self-care activities (6 months)    Goal: Improve healthy living bxs  Objective 1: Improve sleep hygiene practices (6 months)  Objective 2: Increase activity and healthy eating bxs (6 months)    Goal: Improve interpersonal interactions  Objective 1: Learn and utilize effective communication strategies (6 months)  Objective 2: Implement boundaries and engage in limit setting (6 months)    Pt interventions: Focused intervention on restructuring maladaptive thoughts, engage patient in problem focused coping to get health needs met, discussed ways in which patient can engage in behavioral activation within his home given his current limitations. Documentation was done using voice recognition dragon software. Every effort was made to ensure accuracy; however, inadvertent, unintentional computerized transcription errors may be present.

## 2020-08-26 ENCOUNTER — PATIENT MESSAGE (OUTPATIENT)
Dept: INTERNAL MEDICINE CLINIC | Age: 45
End: 2020-08-26

## 2020-08-27 NOTE — TELEPHONE ENCOUNTER
From: Craig Velez  To: James Oliveira MD  Sent: 8/26/2020 5:51 PM EDT  Subject: Prescription Question    Hello, I need a couple of things. I need something for sleep again. I also need a prescription for my handicap placard and need to renew for 5 years again.

## 2020-08-28 RX ORDER — ZOLPIDEM TARTRATE 5 MG/1
TABLET ORAL
Qty: 30 TABLET | Refills: 1 | Status: SHIPPED | OUTPATIENT
Start: 2020-08-28 | End: 2020-09-01 | Stop reason: SDUPTHER

## 2020-09-01 RX ORDER — ZOLPIDEM TARTRATE 5 MG/1
TABLET ORAL
Qty: 30 TABLET | Refills: 1 | Status: SHIPPED | OUTPATIENT
Start: 2020-09-01 | End: 2020-09-30

## 2020-09-11 ENCOUNTER — VIRTUAL VISIT (OUTPATIENT)
Dept: PSYCHOLOGY | Age: 45
End: 2020-09-11
Payer: COMMERCIAL

## 2020-09-11 PROCEDURE — 90832 PSYTX W PT 30 MINUTES: CPT | Performed by: PSYCHOLOGIST

## 2020-09-13 NOTE — PROGRESS NOTES
problem focused coping related to engaging in activity while legs are numb, utilize motivational interviewing to increase patient's awareness of the benefits of behavior change, discussed emotion focused coping skills to address cognitive distortions, and set behavioral activation plan. Patient plans to make a list of 45 actionable goals for his 46th year of life.   O:  MSE:    Appearance    alert, cooperative,   Impulsive behavior No  Speech    normal rate, normal volume and well articulated  Mood    euthymic  Affect    congruent  Thought Content    cognitive distortions  Thought Process    linear and coherent  Associations    logical connections, tangential connections  Insight    Good  Judgment    Intact  Orientation    oriented to person, place, time, and general circumstances  Memory    recent and remote memory intact  Attention/Concentration    intact  Morbid ideation No  Suicide Assessment    no suicidal ideation    History:    Social History:   Social History     Socioeconomic History    Marital status: Single     Spouse name: Not on file    Number of children: Not on file    Years of education: Not on file    Highest education level: Not on file   Occupational History    Not on file   Social Needs    Financial resource strain: Not on file    Food insecurity     Worry: Not on file     Inability: Not on file    Transportation needs     Medical: Not on file     Non-medical: Not on file   Tobacco Use    Smoking status: Never Smoker    Smokeless tobacco: Never Used   Substance and Sexual Activity    Alcohol use: Yes     Comment: rarely    Drug use: No    Sexual activity: Never   Lifestyle    Physical activity     Days per week: Not on file     Minutes per session: Not on file    Stress: Not on file   Relationships    Social connections     Talks on phone: Not on file     Gets together: Not on file     Attends Restorationist service: Not on file     Active member of club or organization: Not on file Attends meetings of clubs or organizations: Not on file     Relationship status: Not on file    Intimate partner violence     Fear of current or ex partner: Not on file     Emotionally abused: Not on file     Physically abused: Not on file     Forced sexual activity: Not on file   Other Topics Concern    Not on file   Social History Narrative    Not on file     TOBACCO:   reports that he has never smoked. He has never used smokeless tobacco.  ETOH:   reports current alcohol use. A:  Patient engaged and cooperative. Denies SI. Insight and motivation are good. Diagnosis:    Major Depressive Disorder, moderate     Plan:    Goal: Enhance capacity to manage mood  Objective 1: Increase engagement in bx activation (6 months)  Objective 2: Utilize cognitive restructuring techniques to manage negative cognitions (6 months)  Objective 3: Increase engagement in self-care activities (6 months)    Goal: Improve healthy living bxs  Objective 1: Improve sleep hygiene practices (6 months)  Objective 2: Increase activity and healthy eating bxs (6 months)    Goal: Improve interpersonal interactions  Objective 1: Learn and utilize effective communication strategies (6 months)  Objective 2: Implement boundaries and engage in limit setting (6 months)    Pt interventions: Focused intervention on problem focused coping related to engaging in activity while legs are numb, utilize motivational interviewing to increase patient's awareness of the benefits of behavior change, discussed emotion focused coping skills to address cognitive distortions, and set behavioral activation plan. Patient plans to make a list of 45 actionable goals for his 46th year of life. Documentation was done using voice recognition dragon software. Every effort was made to ensure accuracy; however, inadvertent, unintentional computerized transcription errors may be present.

## 2020-10-16 ENCOUNTER — VIRTUAL VISIT (OUTPATIENT)
Dept: PSYCHOLOGY | Age: 45
End: 2020-10-16
Payer: COMMERCIAL

## 2020-10-16 PROCEDURE — 90832 PSYTX W PT 30 MINUTES: CPT | Performed by: PSYCHOLOGIST

## 2020-10-16 NOTE — PROGRESS NOTES
control and responsibility, problem focused coping related to engagement, and motivational interviewing.   Patient set goal to set up an appointment with the [facility] dietitian, to stop using dairy in addition to his cooking such as butter and cheese, and to look for a new dialysis center where he would feel more comfortable    O:  MSE:    Appearance    alert, cooperative,   Impulsive behavior No  Speech    normal rate, normal volume and well articulated  Mood    euthymic  Affect    congruent  Thought Content    cognitive distortions  Thought Process    linear and coherent  Associations    logical connections, tangential connections  Insight    Good  Judgment    Intact  Orientation    oriented to person, place, time, and general circumstances  Memory    recent and remote memory intact  Attention/Concentration    intact  Morbid ideation No  Suicide Assessment    no suicidal ideation    History:    Social History:   Social History     Socioeconomic History    Marital status: Single     Spouse name: Not on file    Number of children: Not on file    Years of education: Not on file    Highest education level: Not on file   Occupational History    Not on file   Social Needs    Financial resource strain: Not on file    Food insecurity     Worry: Not on file     Inability: Not on file    Transportation needs     Medical: Not on file     Non-medical: Not on file   Tobacco Use    Smoking status: Never Smoker    Smokeless tobacco: Never Used   Substance and Sexual Activity    Alcohol use: Yes     Comment: rarely    Drug use: No    Sexual activity: Never   Lifestyle    Physical activity     Days per week: Not on file     Minutes per session: Not on file    Stress: Not on file   Relationships    Social connections     Talks on phone: Not on file     Gets together: Not on file     Attends Cheondoism service: Not on file     Active member of club or organization: Not on file     Attends meetings of clubs or organizations: Not on file     Relationship status: Not on file    Intimate partner violence     Fear of current or ex partner: Not on file     Emotionally abused: Not on file     Physically abused: Not on file     Forced sexual activity: Not on file   Other Topics Concern    Not on file   Social History Narrative    Not on file     TOBACCO:   reports that he has never smoked. He has never used smokeless tobacco.  ETOH:   reports current alcohol use. A:  Patient engaged and cooperative. Denies SI. Insight and motivation are good. Diagnosis:    Major Depressive Disorder, moderate     Plan:    Goal: Enhance capacity to manage mood  Objective 1: Increase engagement in bx activation (6 months)  Objective 2: Utilize cognitive restructuring techniques to manage negative cognitions (6 months)  Objective 3: Increase engagement in self-care activities (6 months)    Goal: Improve healthy living bxs  Objective 1: Improve sleep hygiene practices (6 months)  Objective 2: Increase activity and healthy eating bxs (6 months)    Goal: Improve interpersonal interactions  Objective 1: Learn and utilize effective communication strategies (6 months)  Objective 2: Implement boundaries and engage in limit setting (6 months)    Pt interventions: Focused intervention on restructuring maladaptive thoughts, discussion of locus of control and responsibility, problem focused coping related to engagement, and motivational interviewing. Patient set goal to set up an appointment with the [facility] dietitian, to stop using dairy in addition to his cooking such as butter and cheese, and to look for a new dialysis center where he would feel more comfortable    Documentation was done using voice recognition dragon software. Every effort was made to ensure accuracy; however, inadvertent, unintentional computerized transcription errors may be present.

## 2020-11-20 ENCOUNTER — OFFICE VISIT (OUTPATIENT)
Dept: PSYCHOLOGY | Age: 45
End: 2020-11-20
Payer: COMMERCIAL

## 2020-11-20 PROCEDURE — 98968 PH1 ASSMT&MGMT NQHP 21-30: CPT | Performed by: PSYCHOLOGIST

## 2020-11-20 NOTE — PROGRESS NOTES
This note will not be viewable in [employer] for the following reason(s). This is a Psychotherapy Note. Behavioral Health Consultation  Archie Snyder, Ph.D.  Jessica Regalado  11/20/2020  12:04 PM      Time spent with Patient: 23 minutes      Reason for Consult:        Chief Complaint   Patient presents with    Depression      Referring Provider: Bryanna Soto MD  [address] [facility], [address] [place] [address]    Pt provided informed consent for the behavioral health program. Discussed with patient model of service to include the limits of confidentiality (i.e. abuse reporting, suicide  intervention, etc.) and short-term intervention focused approach. Pt indicated understanding. Feedback given to PCP. Patient seen over the phone due to the coronavirus pandemic and inability to establish connection on the [employer] platform    S:  Pt seen per PCP re: depression      Pt seen for f/u. Patient reported that he has not completed his goals that he set forth at last visit. Patient described anxiety related to telling individuals that he will not be spending the holidays with them to the coronavirus pandemic. Patient is worried that he will insult people. Patient has been engaging in avoiding procrastination.   Focused intervention on psychoeducation related to avoidant procrastination, cognitive restructuring, and motivational interviewing to increase patient's awareness of behavior change as well as effective communication strategies and boundary setting    O:  MSE:    Appearance    alert, cooperative,   Impulsive behavior No  Speech    normal rate, normal volume and well articulated  Mood    euthymic  Affect    congruent  Thought Content    cognitive distortions  Thought Process    linear and coherent  Associations    logical connections, tangential connections  Insight    Good  Judgment    Intact  Orientation    oriented to person, place, time, and general circumstances  Memory    recent and remote memory intact  Attention/Concentration    intact  Morbid ideation No  Suicide Assessment    no suicidal ideation    History:    Social History:   Social History     Socioeconomic History    Marital status: Single     Spouse name: Not on file    Number of children: Not on file    Years of education: Not on file    Highest education level: Not on file   Occupational History    Not on file   Social Needs    Financial resource strain: Not on file    Food insecurity     Worry: Not on file     Inability: Not on file    Transportation needs     Medical: Not on file     Non-medical: Not on file   Tobacco Use    Smoking status: Never Smoker    Smokeless tobacco: Never Used   Substance and Sexual Activity    Alcohol use: Yes     Comment: rarely    Drug use: No    Sexual activity: Never   Lifestyle    Physical activity     Days per week: Not on file     Minutes per session: Not on file    Stress: Not on file   Relationships    Social connections     Talks on phone: Not on file     Gets together: Not on file     Attends Moravian service: Not on file     Active member of club or organization: Not on file     Attends meetings of clubs or organizations: Not on file     Relationship status: Not on file    Intimate partner violence     Fear of current or ex partner: Not on file     Emotionally abused: Not on file     Physically abused: Not on file     Forced sexual activity: Not on file   Other Topics Concern    Not on file   Social History Narrative    Not on file     TOBACCO:   reports that he has never smoked. He has never used smokeless tobacco.  ETOH:   reports current alcohol use. A:  Patient engaged and cooperative. Denies SI. Insight and motivation are good. Diagnosis:    Major Depressive Disorder, moderate     Plan:    Goal: Enhance capacity to manage mood  Objective 1:  Increase engagement in bx activation (6 months)  Objective 2: Utilize cognitive restructuring techniques to manage negative cognitions (6 months)  Objective 3: Increase engagement in self-care activities (6 months)    Goal: Improve healthy living bxs  Objective 1: Improve sleep hygiene practices (6 months)  Objective 2: Increase activity and healthy eating bxs (6 months)    Goal: Improve interpersonal interactions  Objective 1: Learn and utilize effective communication strategies (6 months)  Objective 2: Implement boundaries and engage in limit setting (6 months)    Pt interventions: Focused intervention on psychoeducation related to avoidant procrastination, cognitive restructuring, and motivational interviewing to increase patient's awareness of behavior change as well as effective communication strategies and boundary setting    Documentation was done using voice recognition dragon software. Every effort was made to ensure accuracy; however, inadvertent, unintentional computerized transcription errors may be present.

## 2021-01-08 ENCOUNTER — VIRTUAL VISIT (OUTPATIENT)
Dept: PSYCHOLOGY | Age: 46
End: 2021-01-08
Payer: COMMERCIAL

## 2021-01-08 DIAGNOSIS — F33.1 MODERATE EPISODE OF RECURRENT MAJOR DEPRESSIVE DISORDER (HCC): Primary | ICD-10-CM

## 2021-01-08 PROCEDURE — 1036F TOBACCO NON-USER: CPT | Performed by: PSYCHOLOGIST

## 2021-01-08 PROCEDURE — 90832 PSYTX W PT 30 MINUTES: CPT | Performed by: PSYCHOLOGIST

## 2021-01-08 NOTE — PROGRESS NOTES
This note will not be viewable in [employer] for the following reason(s). This is a Psychotherapy Note. Behavioral Health Consultation  Carole Lam, Ph.D.  Patricia Creed  1/8/2021  12:58 PM      Time spent with Patient: 16 minutes      Reason for Consult:        Chief Complaint   Patient presents with    Depression      Referring Provider: Caesar Suresh MD  [address]    Pt provided informed consent for the behavioral health program. Discussed with patient model of service to include the limits of confidentiality (i.e. abuse reporting, suicide  intervention, etc.) and short-term intervention focused approach. Pt indicated understanding. Feedback given to PCP. Patient seen over the phone due to the coronavirus pandemic and inability to establish connection on the [employer] platform    S:  Pt seen per PCP re: depression      Pt seen for f/u. Patient has not been responding well to motivational interviewing or behavioral strategies. Patient continues to not make behavior change. Continue to use motivational interviewing. Assisted patient in utilizing problem solving to find resources.   Patient made plans to call dietitian as well as traditional therapist.    O:  MSE:    Appearance    alert, cooperative,   Impulsive behavior No  Speech    normal rate, normal volume and well articulated  Mood    euthymic  Affect    congruent  Thought Content    cognitive distortions  Thought Process    linear and coherent  Associations    logical connections, tangential connections  Insight    Good  Judgment    Intact  Orientation    oriented to person, place, time, and general circumstances  Memory    recent and remote memory intact  Attention/Concentration    intact  Morbid ideation No  Suicide Assessment    no suicidal ideation    History:    Social History:   Social History     Socioeconomic History    Marital status: Single     Spouse name: Not on file    Number of children: Not on file  Years of education: Not on file    Highest education level: Not on file   Occupational History    Not on file   Social Needs    Financial resource strain: Not on file    Food insecurity     Worry: Not on file     Inability: Not on file    Transportation needs     Medical: Not on file     Non-medical: Not on file   Tobacco Use    Smoking status: Never Smoker    Smokeless tobacco: Never Used   Substance and Sexual Activity    Alcohol use: Yes     Comment: rarely    Drug use: No    Sexual activity: Never   Lifestyle    Physical activity     Days per week: Not on file     Minutes per session: Not on file    Stress: Not on file   Relationships    Social connections     Talks on phone: Not on file     Gets together: Not on file     Attends Restoration service: Not on file     Active member of club or organization: Not on file     Attends meetings of clubs or organizations: Not on file     Relationship status: Not on file    Intimate partner violence     Fear of current or ex partner: Not on file     Emotionally abused: Not on file     Physically abused: Not on file     Forced sexual activity: Not on file   Other Topics Concern    Not on file   Social History Narrative    Not on file     TOBACCO:   reports that he has never smoked. He has never used smokeless tobacco.  ETOH:   reports current alcohol use. A:  Patient engaged and cooperative. Denies SI. Insight and motivation are good. Diagnosis:    Major Depressive Disorder, moderate     Plan:    Goal: Enhance capacity to manage mood  Objective 1: Increase engagement in bx activation (6 months)  Objective 2: Utilize cognitive restructuring techniques to manage negative cognitions (6 months)  Objective 3: Increase engagement in self-care activities (6 months)    Goal: Improve healthy living bxs  Objective 1: Improve sleep hygiene practices (6 months)  Objective 2:  Increase activity and healthy eating bxs (6 months) Goal: Improve interpersonal interactions  Objective 1: Learn and utilize effective communication strategies (6 months)  Objective 2: Implement boundaries and engage in limit setting (6 months)    Pt interventions:  See above    Documentation was done using voice recognition dragon software. Every effort was made to ensure accuracy; however, inadvertent, unintentional computerized transcription errors may be present.

## 2021-02-05 ENCOUNTER — VIRTUAL VISIT (OUTPATIENT)
Dept: PSYCHOLOGY | Age: 46
End: 2021-02-05
Payer: COMMERCIAL

## 2021-02-05 DIAGNOSIS — F33.1 MODERATE EPISODE OF RECURRENT MAJOR DEPRESSIVE DISORDER (HCC): Primary | ICD-10-CM

## 2021-02-05 PROCEDURE — 90832 PSYTX W PT 30 MINUTES: CPT | Performed by: PSYCHOLOGIST

## 2021-02-05 PROCEDURE — 1036F TOBACCO NON-USER: CPT | Performed by: PSYCHOLOGIST

## 2021-02-12 ENCOUNTER — VIRTUAL VISIT (OUTPATIENT)
Dept: PSYCHOLOGY | Age: 46
End: 2021-02-12
Payer: COMMERCIAL

## 2021-02-12 DIAGNOSIS — F33.1 MODERATE EPISODE OF RECURRENT MAJOR DEPRESSIVE DISORDER (HCC): Primary | ICD-10-CM

## 2021-02-12 PROCEDURE — 90832 PSYTX W PT 30 MINUTES: CPT | Performed by: PSYCHOLOGIST

## 2021-02-12 NOTE — PROGRESS NOTES
This note will not be viewable in "[employer] for the following reason(s). This is a Psychotherapy Note. Behavioral Health Consultation  Dontae Jacobo, Ph.D.  Psychologist  2/5/2021  10:10 AM      Time spent with Patient: 16 minutes      Reason for Consult:        Chief Complaint   Patient presents with    Depression      Referring Provider: Luis Cummins MD  [address] [facility], [address] [place] [address]    Pt provided informed consent for the behavioral health program. Discussed with patient model of service to include the limits of confidentiality (i.e. abuse reporting, suicide  intervention, etc.) and short-term intervention focused approach. Pt indicated understanding. Feedback given to PCP. Patient seen over the phone due to the coronavirus pandemic and inability to establish connection on the [employer] platform    S:  Pt seen per PCP re: depression      Pt seen for f/u. Patient continues to struggle to meet behavior goals. We discussed this in length. Continue to utilize motivational interviewing. Patient set goal to set up an appointment with dietitian.   O:  MSE:    Appearance    alert, cooperative,   Impulsive behavior No  Speech    normal rate, normal volume and well articulated  Mood    euthymic  Affect    congruent  Thought Content    cognitive distortions  Thought Process    linear and coherent  Associations    logical connections, tangential connections  Insight    Good  Judgment    Intact  Orientation    oriented to person, place, time, and general circumstances  Memory    recent and remote memory intact  Attention/Concentration    intact  Morbid ideation No  Suicide Assessment    no suicidal ideation    History:    Social History:   Social History     Socioeconomic History    Marital status: Single     Spouse name: Not on file    Number of children: Not on file    Years of education: Not on file    Highest education level: Not on file   Occupational History    Not on file   Social Needs    Financial resource strain: Not on file    Food insecurity     Worry: Not on file     Inability: Not on file    Transportation needs     Medical: Not on file     Non-medical: Not on file   Tobacco Use    Smoking status: Never Smoker    Smokeless tobacco: Never Used   Substance and Sexual Activity    Alcohol use: Yes     Comment: rarely    Drug use: No    Sexual activity: Never   Lifestyle    Physical activity     Days per week: Not on file     Minutes per session: Not on file    Stress: Not on file   Relationships    Social connections     Talks on phone: Not on file     Gets together: Not on file     Attends Lutheran service: Not on file     Active member of club or organization: Not on file     Attends meetings of clubs or organizations: Not on file     Relationship status: Not on file    Intimate partner violence     Fear of current or ex partner: Not on file     Emotionally abused: Not on file     Physically abused: Not on file     Forced sexual activity: Not on file   Other Topics Concern    Not on file   Social History Narrative    Not on file     TOBACCO:   reports that he has never smoked. He has never used smokeless tobacco.  ETOH:   reports current alcohol use. A:  Patient engaged and cooperative. Denies SI. Insight and motivation are good. Diagnosis:    Major Depressive Disorder, moderate , reccurent    Plan:    Goal: Enhance capacity to manage mood  Objective 1: Increase engagement in bx activation (6 months)  Objective 2: Utilize cognitive restructuring techniques to manage negative cognitions (6 months)  Objective 3: Increase engagement in self-care activities (6 months)    Goal: Improve healthy living bxs  Objective 1: Improve sleep hygiene practices (6 months)  Objective 2:  Increase activity and healthy eating bxs (6 months)    Goal: Improve interpersonal interactions  Objective 1: Learn and utilize effective communication strategies (6 months)  Objective 2: Implement boundaries and engage in limit setting (6 months)    Pt interventions:  See above    Documentation was done using voice recognition dragon software. Every effort was made to ensure accuracy; however, inadvertent, unintentional computerized transcription errors may be present.

## 2021-02-12 NOTE — PROGRESS NOTES
Behavioral Health Consultation  Devin Crockett M.A. Psychology Assistant  Dontae Jacobo, Ph.D. Supervising Psychologist  2/14/2021  9:50 PM      Time spent with Patient: 30 minutes    Reason for Consult:        Chief Complaint   Patient presents with    Depression      Referring Provider: Luis Cummins MD    Pt provided informed consent for the behavioral health program. Discussed with patient model of service to include the limits of confidentiality (i.e. abuse reporting, suicide intervention, etc.) and short-term intervention focused approach. Pt indicated understanding. Feedback given to PCP. Pt provided verbal consent re nature of supervision, supervisee status. Pt seen via [email or web address] (a secure video platform) for a telepsychological visit d/t Coronavirus pandemic. Pt was positively identified and alternate means of communication and emergency contact were verified within the patient demographic information of their EMR. A telephone number to our office was provided and a plan was set if their were to be a disruption of services. Consent for the telepsychological visit was verbally obtained and we reviewed the limitations and innovative nature of using distance technology. Risks to confidentiality were discussed and Pt identified that they were taking the call at home and indicated the no other persons were in the room with them provider was at the office located in [facility]. S:  Pt seen per PCP re: depression    Pt seen for f/u. Pt reported mood has been good, upbeat. Since last visit, pt reported he started walking every other day. Pt currently at 289 lbs and needs to be at BMI of 35 for transplant. Pt would like to decrease to 220 lbs (personal goal). Barriers to exercise have been the weather and pandemic limitations. Pt's routine consists of going to class, dialysis and working at computer. Discussed taking laps around apt every 30 mins.  Pt reported getting 3-4 hrs of sleep and being unable to hear alarm in the morning. Issues falling and staying asleep and getting out of bed in the morning. Discussed sleep hygiene and keeping a sleep diary. Pt set up appt w/ [facility] dietitian for April. Focused intervention on restructuring maladaptive thoughts, discussion of locus of control and responsibility, problem focused coping related to engagement, and motivational interviewing.   Patient set goal to stop using dairy in addition to his cooking such as butter and cheese, and to look for a new dialysis center where he would feel more comfortable    O:  MSE:    Appearance    alert, cooperative,   Impulsive behavior No  Speech    normal rate, normal volume and well articulated  Mood    euthymic  Affect    congruent  Thought Content    cognitive distortions  Thought Process    linear and coherent  Associations    logical connections, tangential connections  Insight    Good  Judgment    Intact  Orientation    oriented to person, place, time, and general circumstances  Memory    recent and remote memory intact  Attention/Concentration    intact  Morbid ideation No  Suicide Assessment    no suicidal ideation    History:    Social History:   Social History     Socioeconomic History    Marital status: Single     Spouse name: Not on file    Number of children: Not on file    Years of education: Not on file    Highest education level: Not on file   Occupational History    Not on file   Social Needs    Financial resource strain: Not on file    Food insecurity     Worry: Not on file     Inability: Not on file   [employer] needs     Medical: Not on file     Non-medical: Not on file   Tobacco Use    Smoking status: Never Smoker    Smokeless tobacco: Never Used   Substance and Sexual Activity    Alcohol use: Yes     Comment: rarely    Drug use: No    Sexual activity: Never   Lifestyle    Physical activity     Days per week: Not on file     Minutes per session: Not on file    Stress: Not on file

## 2021-02-26 ENCOUNTER — PATIENT MESSAGE (OUTPATIENT)
Dept: PSYCHOLOGY | Age: 46
End: 2021-02-26

## 2021-03-15 NOTE — TELEPHONE ENCOUNTER
From: Campbell Garcia  To: Margaux Mayorga, PhD  Sent: 2/26/2021 1:36 PM EST  Subject: Non-Urgent Medical Question    Hello,    I just wanted to say my apologies to Ms Gallo Hull for missing the appointment.

## 2021-03-19 ENCOUNTER — VIRTUAL VISIT (OUTPATIENT)
Dept: PSYCHOLOGY | Age: 46
End: 2021-03-19
Payer: COMMERCIAL

## 2021-03-19 DIAGNOSIS — F33.1 MODERATE EPISODE OF RECURRENT MAJOR DEPRESSIVE DISORDER (HCC): Primary | ICD-10-CM

## 2021-03-19 PROCEDURE — 90832 PSYTX W PT 30 MINUTES: CPT | Performed by: PSYCHOLOGIST

## 2021-03-19 NOTE — PROGRESS NOTES
Behavioral Health Consultation  Linda Chávez M.A. Psychology Assistant  Kishore Penn, Ph.D. Supervising Psychologist  3/20/2021  10:58 AM      Time spent with Patient: 30 minutes    Reason for Consult:        Chief Complaint   Patient presents with    Depression      Referring Provider: John Sparrow MD    Pt provided informed consent for the behavioral health program. Discussed with patient model of service to include the limits of confidentiality (i.e. abuse reporting, suicide intervention, etc.) and short-term intervention focused approach. Pt indicated understanding. Feedback given to PCP. Pt provided verbal consent re nature of supervision, supervisee status. Pt seen via [email or web address] (a secure video platform) for a telepsychological visit d/t Coronavirus pandemic. Pt was positively identified and alternate means of communication and emergency contact were verified within the patient demographic information of their EMR. A telephone number to our office was provided and a plan was set if their were to be a disruption of services. Consent for the telepsychological visit was verbally obtained and we reviewed the limitations and innovative nature of using distance technology. Risks to confidentiality were discussed and Pt identified that they were taking the call at home and indicated the no other persons were in the room with them provider was at the office located in [facility]. S:  Pt seen per PCP re: depression    Pt seen for f/u. Pt reported mood was down d/t losing eyesight in left eye about 3-4 wks ago. Pt underwent tests that came back negative and needs to f/u w/ specialists. Pt stated he has been in the house since eye incident and has been adjusting and adapting at home and while driving, and not using computer as much. D/t incident, pt hasn't been walking or been active outside of the home.  Reported sleep has been \"horrible\", getting 3-4 hrs but broken up -wakes up suddenly throughout Smoking status: Never Smoker    Smokeless tobacco: Never Used   Substance and Sexual Activity    Alcohol use: Yes     Comment: rarely    Drug use: No    Sexual activity: Never   Lifestyle    Physical activity     Days per week: Not on file     Minutes per session: Not on file    Stress: Not on file   Relationships    Social connections     Talks on phone: Not on file     Gets together: Not on file     Attends Gnosticism service: Not on file     Active member of club or organization: Not on file     Attends meetings of clubs or organizations: Not on file     Relationship status: Not on file    Intimate partner violence     Fear of current or ex partner: Not on file     Emotionally abused: Not on file     Physically abused: Not on file     Forced sexual activity: Not on file   Other Topics Concern    Not on file   Social History Narrative    Not on file     TOBACCO:   reports that he has never smoked. He has never used smokeless tobacco.  ETOH:   reports current alcohol use. A:  Patient engaged and cooperative. Denies SI. Insight and motivation are good. Diagnosis:  Major Depressive Disorder, moderate     Plan:  Pt will f/u in a week to maintain social accountability for recently set bx activation/physical activity goals. Pt interventions: Focused intervention on restructuring maladaptive thoughts, discussion of locus of control and responsibility, problem focused coping related to engagement, and motivational interviewing. Patient set goal to visit [facility] dietitian, to stop using dairy in addition to his cooking such as butter and cheese, and to look for a new dialysis center where he would feel more comfortable.

## 2021-03-26 ENCOUNTER — VIRTUAL VISIT (OUTPATIENT)
Dept: PSYCHOLOGY | Age: 46
End: 2021-03-26
Payer: COMMERCIAL

## 2021-03-26 DIAGNOSIS — F33.1 MODERATE EPISODE OF RECURRENT MAJOR DEPRESSIVE DISORDER (HCC): Primary | ICD-10-CM

## 2021-03-26 PROCEDURE — 90832 PSYTX W PT 30 MINUTES: CPT | Performed by: PSYCHOLOGIST

## 2021-03-26 NOTE — PROGRESS NOTES
Behavioral Health Consultation  Clarissa Vick M.A. Psychology Assistant  Karie Schilling, Ph.D. Supervising Psychologist  3/26/2021  4:02 PM      Time spent with Patient: 30 minutes    Reason for Consult:        Chief Complaint   Patient presents with    Depression      Referring Provider: Axel Jo MD    Pt provided informed consent for the behavioral health program. Discussed with patient model of service to include the limits of confidentiality (i.e. abuse reporting, suicide intervention, etc.) and short-term intervention focused approach. Pt indicated understanding. Feedback given to PCP. Pt provided verbal consent re nature of supervision, supervisee status. Pt seen via [email or web address] (a secure video platform) for a telepsychological visit d/t Coronavirus pandemic. Pt was positively identified and alternate means of communication and emergency contact were verified within the patient demographic information of their EMR. A telephone number to our office was provided and a plan was set if their were to be a disruption of services. Consent for the telepsychological visit was verbally obtained and we reviewed the limitations and innovative nature of using distance technology. Risks to confidentiality were discussed and Pt identified that they were taking the call at home and indicated the no other persons were in the room with them provider was at the office located in [facility]. S:  Pt seen per PCP re: depression    Pt seen for f/u. Pt stated he's felt good since our last visit. Stated he got a few walks in, and has been going to doctor's appts and getting needed blood work. Pt took 3 walks this wk -went to corner and back. Stated his leg begins hurting and becomes numb. Pt takes a few mins and usually pain goes away after 30mins.  Pt has not been cooking as much as he intended, doing so only 2x this wk, and instead buying fast food d/t \"tiredness and laziness\" Pt wants to take vitamins and thinking of scheduling appt w/ Joleen Finder. Pt stated he's to be placed on the transplant list in 8 wks and needs to complete EKG, heart test and see neurologist. Pt looking into stationary bike for now and wants to be able to buy bike in near future. Pt continues to adjust to visual changes and challenges especially while driving, and trying to be more vocal about his needs (e.g., having other person in line of sight while conversing). Goals for next visit: Walk to next stop sign and back 3x a wk every other morning  Decrease Dr. Marshal Bryant: Currently drinks two 32oz cups per day. Wants to decrease by Marika Green and discussed trying diet Dr. Marshal Bryant. Focused visit on: setting goals for bx activation/physical activity; f/u on diet, weight loss. O:  MSE:    Appearance    alert, cooperative,   Impulsive behavior No  Speech    normal rate, normal volume and well articulated  Mood    euthymic  Affect    congruent  Thought Content    cognitive distortions  Thought Process    linear and coherent  Associations    logical connections, tangential connections  Insight    Good  Judgment    Intact  Orientation    oriented to person, place, time, and general circumstances  Memory    recent and remote memory intact  Attention/Concentration    intact  Morbid ideation No  Suicide Assessment    no suicidal ideation    History:    Social History:   Social History     Socioeconomic History    Marital status: Single     Spouse name: Not on file    Number of children: Not on file    Years of education: Not on file    Highest education level: Not on file   Occupational History    Not on file   Social Needs    Financial resource strain: Not on file    Food insecurity     Worry: Not on file     Inability: Not on file    Transportation needs     Medical: Not on file     Non-medical: Not on file   Tobacco Use    Smoking status: Never Smoker    Smokeless tobacco: Never Used   Substance and Sexual Activity    Alcohol use:  Yes Comment: rarely    Drug use: No    Sexual activity: Never   Lifestyle    Physical activity     Days per week: Not on file     Minutes per session: Not on file    Stress: Not on file   Relationships    Social connections     Talks on phone: Not on file     Gets together: Not on file     Attends Holiness service: Not on file     Active member of club or organization: Not on file     Attends meetings of clubs or organizations: Not on file     Relationship status: Not on file    Intimate partner violence     Fear of current or ex partner: Not on file     Emotionally abused: Not on file     Physically abused: Not on file     Forced sexual activity: Not on file   Other Topics Concern    Not on file   Social History Narrative    Not on file     TOBACCO:   reports that he has never smoked. He has never used smokeless tobacco.  ETOH:   reports current alcohol use. A:  Patient engaged and cooperative. Denies SI. Insight and motivation are good. Diagnosis:  Major Depressive Disorder, moderate     Plan:  Pt will f/u in a week to maintain social accountability for recently set bx activation/physical activity goals. Pt interventions: Focused intervention on restructuring maladaptive thoughts, discussion of locus of control and responsibility, problem focused coping related to engagement, and motivational interviewing. Patient set goal to visit [facility] dietitian, to stop using dairy in addition to his cooking such as butter and cheese, and to look for a new dialysis center where he would feel more comfortable.

## 2021-04-09 ENCOUNTER — VIRTUAL VISIT (OUTPATIENT)
Dept: PSYCHOLOGY | Age: 46
End: 2021-04-09
Payer: COMMERCIAL

## 2021-04-09 DIAGNOSIS — F33.1 MODERATE EPISODE OF RECURRENT MAJOR DEPRESSIVE DISORDER (HCC): Primary | ICD-10-CM

## 2021-04-09 PROCEDURE — 90832 PSYTX W PT 30 MINUTES: CPT | Performed by: PSYCHOLOGIST

## 2021-04-09 NOTE — PROGRESS NOTES
Behavioral Health Consultation  Viridiana Murrieta M.A. Psychology Assistant  Candice Ross, Ph.D. Supervising Psychologist  4/9/2021  1:48 PM      Time spent with Patient: 30 minutes    Reason for Consult:        Chief Complaint   Patient presents with    Depression      Referring Provider: Dank Dumont MD    Pt provided informed consent for the behavioral health program. Discussed with patient model of service to include the limits of confidentiality (i.e. abuse reporting, suicide intervention, etc.) and short-term intervention focused approach. Pt indicated understanding. Feedback given to PCP. Pt provided verbal consent re nature of supervision, supervisee status. Pt seen via [email or web address] (a secure video platform) for a telepsychological visit d/t Coronavirus pandemic. Pt was positively identified and alternate means of communication and emergency contact were verified within the patient demographic information of their EMR. A telephone number to our office was provided and a plan was set if their were to be a disruption of services. Consent for the telepsychological visit was verbally obtained and we reviewed the limitations and innovative nature of using distance technology. Risks to confidentiality were discussed and Pt identified that they were taking the call at home and indicated the no other persons were in the room with them provider was at the office located in [place] [id number]. S:  Pt seen per PCP re: depression    Pt seen for f/u. Pt reported he was at the ER all night on Wed d/t pain in his side that's been there about a wk. Pain sensation is burning or stabbing, and ER gave him pills but were unable to give him a dx. They recommended he return to ER if doesn't improve bc his PCP is in [facility]. Pt has insomnia also and was unable to sleep last night. Stated he slept from 4-7 after dialysis and power went off at 7 pm, and pt was unable to sleep until late morning and woke up before session.  Pt stated he \"hasn't been doing great this week\" with goals. Pt walking around [employer] d/t rain and drinking more water and 32 oz of dr. Cristy Talavera (instead of 64oz). Pt stated walking is not pushing him enough and wants to do more to get his heart rate up and eventually be able to jog. Pt hasn't been eating correctly this wk -eating chips mainly. Pt otherwise busy putting desk together, cleaning kitchen, and trying to identify items in fridge and prepping for dietitian meet. Has scale next to bathroom to track weight and wants to lose 8-10 lbs by May 7th (nephew's graduation, so easy date to remember). Goals for next visit: Walk every day -4 laps around [employer] for a total of 30 mins. Wants to drink more water. Drinking [place] currently and wants to go up to 16 oz in 4 oz increments. Focused visit on: setting goals for bx activation/physical activity; f/u on diet, weight loss.    O:  MSE:    Appearance    alert, cooperative,   Impulsive behavior No  Speech    normal rate, normal volume and well articulated  Mood    euthymic  Affect    congruent  Thought Content    cognitive distortions  Thought Process    linear and coherent  Associations    logical connections, tangential connections  Insight    Good  Judgment    Intact  Orientation    oriented to person, place, time, and general circumstances  Memory    recent and remote memory intact  Attention/Concentration    intact  Morbid ideation No  Suicide Assessment    no suicidal ideation    History:    Social History:   Social History     Socioeconomic History    Marital status: Single     Spouse name: Not on file    Number of children: Not on file    Years of education: Not on file    Highest education level: Not on file   Occupational History    Not on file   Social Needs    Financial resource strain: Not on file    Food insecurity     Worry: Not on file     Inability: Not on file    Transportation needs     Medical: Not on file     Non-medical: Not on file

## 2021-04-30 ENCOUNTER — VIRTUAL VISIT (OUTPATIENT)
Dept: PSYCHOLOGY | Age: 46
End: 2021-04-30
Payer: COMMERCIAL

## 2021-04-30 DIAGNOSIS — F33.1 MODERATE EPISODE OF RECURRENT MAJOR DEPRESSIVE DISORDER (HCC): Primary | ICD-10-CM

## 2021-04-30 PROCEDURE — 90832 PSYTX W PT 30 MINUTES: CPT | Performed by: PSYCHOLOGIST

## 2021-04-30 NOTE — PROGRESS NOTES
dietitian who sent info about meal planning. Pt stated they have yet to develop a plan and he hasn' incorporated tips as of yet. Pt stated he needs to visit a [employer] farther away d/t produce quality. Re walking, pt stated he hasn't met goals and went around the block with slowed pace. Pt reported that after second covid shot he felt ill and tired. Pt reported he's going out of town for [employer] graduation and we discussed coping ahead. Pt will try to adhere to decreased soda and increased water consumption, and will check if there's an exercise room open while at hotel. Focused visit on: setting goals for bx activation/physical activity; f/u on diet, weight loss.      O:  MSE:  Appearance    alert, cooperative,   Impulsive behavior No  Speech    normal rate, normal volume and well articulated  Mood    euthymic  Affect    congruent  Thought Content    cognitive distortions  Thought Process    linear and coherent  Associations    logical connections, tangential connections  Insight    Good  Judgment    Intact  Orientation    oriented to person, place, time, and general circumstances  Memory    recent and remote memory intact  Attention/Concentration    intact  Morbid ideation No  Suicide Assessment    no suicidal ideation    History:    Social History:   Social History     Socioeconomic History    Marital status: Single     Spouse name: Not on file    Number of children: Not on file    Years of education: Not on file    Highest education level: Not on file   Occupational History    Not on file   Social Needs    Financial resource strain: Not on file    Food insecurity     Worry: Not on file     Inability: Not on file   [employer] needs     Medical: Not on file     Non-medical: Not on file   Tobacco Use    Smoking status: Never Smoker    Smokeless tobacco: Never Used   Substance and Sexual Activity    Alcohol use: Yes     Comment: rarely    Drug use: No    Sexual activity: Never   Lifestyle    Physical activity     Days per week: Not on file     Minutes per session: Not on file    Stress: Not on file   Relationships    Social connections     Talks on phone: Not on file     Gets together: Not on file     Attends Methodist service: Not on file     Active member of club or organization: Not on file     Attends meetings of clubs or organizations: Not on file     Relationship status: Not on file    Intimate partner violence     Fear of current or ex partner: Not on file     Emotionally abused: Not on file     Physically abused: Not on file     Forced sexual activity: Not on file   Other Topics Concern    Not on file   Social History Narrative    Not on file     TOBACCO:   reports that he has never smoked. He has never used smokeless tobacco.  ETOH:   reports current alcohol use. A:  Patient engaged and cooperative. Denies SI. Insight and motivation are good. Diagnosis:  Moderate episode of recurrent major depressive disorder    Plan:  Pt will f/u to maintain social accountability for recently set bx activation/physical activity goals. Pt interventions: Focused intervention on restructuring maladaptive thoughts, discussion of locus of control and responsibility, problem focused coping related to engagement, and motivational interviewing. Patient set goal to visit [facility] dietitian, to stop using dairy in addition to his cooking such as butter and cheese, and to look for a new dialysis center where he would feel more comfortable.

## 2021-05-21 ENCOUNTER — OFFICE VISIT (OUTPATIENT)
Dept: PSYCHOLOGY | Age: 46
End: 2021-05-21
Payer: COMMERCIAL

## 2021-05-21 DIAGNOSIS — F33.1 MODERATE EPISODE OF RECURRENT MAJOR DEPRESSIVE DISORDER (HCC): Primary | ICD-10-CM

## 2021-05-21 PROCEDURE — 1036F TOBACCO NON-USER: CPT | Performed by: PSYCHOLOGIST

## 2021-05-21 PROCEDURE — 90832 PSYTX W PT 30 MINUTES: CPT | Performed by: PSYCHOLOGIST

## 2021-05-21 NOTE — PROGRESS NOTES
communication. Stated they completed labs and called him re results but gave him no plan or f/u. Pt stated sleep hasn't improved and discussed additional sleep hygiene tips and grounding/distraction to help sleep. Focused visit on: setting goals for bx activation/physical activity; f/u on diet, weight loss. O:  MSE:  Appearance    alert, cooperative,   Impulsive behavior No  Speech    normal rate, normal volume and well articulated  Mood    euthymic  Affect    congruent  Thought Content    cognitive distortions  Thought Process    linear and coherent  Associations    logical connections, tangential connections  Insight    Good  Judgment    Intact  Orientation    oriented to person, place, time, and general circumstances  Memory    recent and remote memory intact  Attention/Concentration    intact  Morbid ideation No  Suicide Assessment    no suicidal ideation    History:    Social History:   Social History     Socioeconomic History    Marital status: Single     Spouse name: Not on file    Number of children: Not on file    Years of education: Not on file    Highest education level: Not on file   Occupational History    Not on file   Tobacco Use    Smoking status: Never Smoker    Smokeless tobacco: Never Used   Vaping Use    Vaping Use: Never used   Substance and Sexual Activity    Alcohol use: Yes     Comment: rarely    Drug use: No    Sexual activity: Never   Other Topics Concern    Not on file   Social History Narrative    Not on file     Social Determinants of Health     Financial Resource Strain:     Difficulty of Paying Living Expenses:    Food Insecurity:     Worried About Running Out of Food in the Last Year:     Ran Out of Food in the Last Year:    Transportation Needs:     Lack of Transportation (Medical):      Lack of Transportation (Non-Medical):    Physical Activity:     Days of Exercise per Week:     Minutes of Exercise per Session:    Stress:     Feeling of Stress :    Social Connections:     Frequency of Communication with Friends and Family:     Frequency of Social Gatherings with Friends and Family:     Attends Shinto Services:     Active Member of Clubs or Organizations:     Attends Club or Organization Meetings:     Marital Status:    Intimate Partner Violence:     Fear of Current or Ex-Partner:     Emotionally Abused:     Physically Abused:     Sexually Abused:      TOBACCO:   reports that he has never smoked. He has never used smokeless tobacco.  ETOH:   reports current alcohol use. A:  Patient engaged and cooperative. Denies SI. Diagnosis:  Moderate episode of recurrent major depressive disorder    Plan:  Pt will f/u to maintain social accountability for recently set bx activation/physical activity goals. Pt interventions: Focused intervention on restructuring maladaptive thoughts, discussion of locus of control and responsibility, problem focused coping related to engagement, and motivational interviewing. Patient set goal to visit [place] dietitian, to stop using dairy in addition to his cooking such as butter and cheese, and to look for a new dialysis center where he would feel more comfortable.

## 2021-06-11 ENCOUNTER — VIRTUAL VISIT (OUTPATIENT)
Dept: PSYCHOLOGY | Age: 46
End: 2021-06-11
Payer: COMMERCIAL

## 2021-06-11 DIAGNOSIS — F33.1 MODERATE EPISODE OF RECURRENT MAJOR DEPRESSIVE DISORDER (HCC): Primary | ICD-10-CM

## 2021-06-11 PROCEDURE — 90832 PSYTX W PT 30 MINUTES: CPT | Performed by: PSYCHOLOGIST

## 2021-06-11 NOTE — PROGRESS NOTES
Behavioral Health Consultation  Teresita Carballo M.A. Psychology Assistant  Brenden Mathew, Ph.D. Supervising Psychologist  6/11/2021  1:53 PM      Time spent with Patient: 30 minutes    Reason for Consult:        Chief Complaint   Patient presents with    Depression      Referring Provider: Berta Scott MD    Pt provided informed consent for the behavioral health program. Discussed with patient model of service to include the limits of confidentiality (i.e. abuse reporting, suicide intervention, etc.) and short-term intervention focused approach. Pt indicated understanding. Feedback given to PCP. Pt provided verbal consent re nature of supervision, supervisee status. Pt seen via [email or web address] (a secure video platform) for a telepsychological visit d/t Coronavirus pandemic. Pt was positively identified and alternate means of communication and emergency contact were verified within the patient demographic information of their EMR. A telephone number to our office was provided and a plan was set if their were to be a disruption of services. Consent for the telepsychological visit was verbally obtained and we reviewed the limitations and innovative nature of using distance technology. Risks to confidentiality were discussed and Pt identified that they were taking the call at home and indicated the no other persons were in the room with them provider was at the office located in [facility]. S:  Pt seen per PCP re: depression    Pt seen for f/u. Pt reported walking in park w/ friend for 1 hr a couple of times per wk. Pt has been cooking breakfast, not eating lunch and having dinner at 8 or 9 pm. Pt stated portions have become smaller and his appetite continues to decrease. Pt reported drinking more water (though hasn't measured) and not drinking as much soda. Pt reported his sleep has been \"terrible\". Pt sleeps 2-3 hrs before dialysis and 2-3 hrs after dialysis, and then is up all night.  Wakes up tired irrespective of number of hrs slept. Discussed beginning CBT-I protocol w/ Dr. Cornelia Miller or next trainee. Pt stated he's meeting w/ a psychologist as per transplant requirement, and we discussed dual provision of services. Pt will call to schedule appt depending on whether tx overlaps and on duration. O:  MSE:  Appearance    alert, cooperative,   Impulsive behavior No  Speech    normal rate, normal volume and well articulated  Mood    euthymic  Affect    congruent  Thought Content    cognitive distortions  Thought Process    linear and coherent  Associations    logical connections  Insight    Good  Judgment    Intact  Orientation    oriented to person, place, time, and general circumstances  Memory    recent and remote memory intact  Attention/Concentration    intact  Morbid ideation No  Suicide Assessment    no suicidal ideation    History:    Social History:   Social History     Socioeconomic History    Marital status: Single     Spouse name: Not on file    Number of children: Not on file    Years of education: Not on file    Highest education level: Not on file   Occupational History    Not on file   Tobacco Use    Smoking status: Never Smoker    Smokeless tobacco: Never Used   Vaping Use    Vaping Use: Never used   Substance and Sexual Activity    Alcohol use: Yes     Comment: rarely    Drug use: No    Sexual activity: Never   Other Topics Concern    Not on file   Social History Narrative    Not on file     Social Determinants of Health     Financial Resource Strain:     Difficulty of Paying Living Expenses:    Food Insecurity:     Worried About Running Out of Food in the Last Year:     Ran Out of Food in the Last Year:    Transportation Needs:     Lack of Transportation (Medical):      Lack of Transportation (Non-Medical):    Physical Activity:     Days of Exercise per Week:     Minutes of Exercise per Session:    Stress:     Feeling of Stress :    Social Connections:     Frequency of Communication with Friends and Family:     Frequency of Social Gatherings with Friends and Family:     Attends Mu-ism Services:     Active Member of Clubs or Organizations:     Attends Club or Organization Meetings:     Marital Status:    Intimate Partner Violence:     Fear of Current or Ex-Partner:     Emotionally Abused:     Physically Abused:     Sexually Abused:      TOBACCO:   reports that he has never smoked. He has never used smokeless tobacco.  ETOH:   reports current alcohol use. A:  Patient engaged and cooperative. Denies SI. Diagnosis:  Moderate episode of recurrent major depressive disorder    Plan:  Please f/u w/ CBT-I. Pt interventions: Focused intervention on restructuring maladaptive thoughts, discussion of locus of control and responsibility, problem focused coping related to engagement, and motivational interviewing. Patient set goal to visit [facility] dietitian, to stop using dairy in addition to his cooking such as butter and cheese, and to look for a new dialysis center where he would feel more comfortable.

## 2024-03-08 NOTE — TELEPHONE ENCOUNTER
LOV 11/20/18, [employer] not scheduled Still nauseated- relieved by zofran   Has only had to take twice  Appetite is okay (had bagel for breakfast, soup for lunch)  Bowel movements are regular    At times feeling dizzy with headache  Takes tylenol prn  Does not think her bp is the issue  She does not think she is drinking enough water  Tries to drink flavored water  Does not enjoy the taste of it    Has BP cuff at home  Does not routinely use  Batteries are dead  Encouraged her to replace batteries and check bp    Confirmed he will see Dr. Johnson on 3/12    She has paperwork for her job  Advised her to take to her follow up on Tues